# Patient Record
Sex: FEMALE | Race: BLACK OR AFRICAN AMERICAN | NOT HISPANIC OR LATINO | Employment: UNEMPLOYED | URBAN - NONMETROPOLITAN AREA
[De-identification: names, ages, dates, MRNs, and addresses within clinical notes are randomized per-mention and may not be internally consistent; named-entity substitution may affect disease eponyms.]

---
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
## 2022-04-10 ENCOUNTER — HISTORICAL (OUTPATIENT)
Dept: ADMINISTRATIVE | Facility: HOSPITAL | Age: 22
End: 2022-04-10

## 2022-04-26 VITALS
HEIGHT: 65 IN | WEIGHT: 154.31 LBS | BODY MASS INDEX: 25.71 KG/M2 | DIASTOLIC BLOOD PRESSURE: 60 MMHG | SYSTOLIC BLOOD PRESSURE: 96 MMHG | OXYGEN SATURATION: 98 %

## 2022-05-04 ENCOUNTER — HISTORICAL (OUTPATIENT)
Dept: ADMINISTRATIVE | Facility: HOSPITAL | Age: 22
End: 2022-05-04

## 2022-09-21 ENCOUNTER — HISTORICAL (OUTPATIENT)
Dept: ADMINISTRATIVE | Facility: HOSPITAL | Age: 22
End: 2022-09-21

## 2023-02-06 ENCOUNTER — HOSPITAL ENCOUNTER (EMERGENCY)
Facility: HOSPITAL | Age: 23
Discharge: HOME | End: 2023-02-06
Attending: FAMILY MEDICINE
Payer: MEDICAID

## 2023-02-06 VITALS
OXYGEN SATURATION: 100 % | RESPIRATION RATE: 17 BRPM | WEIGHT: 136 LBS | HEART RATE: 83 BPM | BODY MASS INDEX: 23.22 KG/M2 | TEMPERATURE: 99 F | SYSTOLIC BLOOD PRESSURE: 115 MMHG | HEIGHT: 64 IN | DIASTOLIC BLOOD PRESSURE: 65 MMHG

## 2023-02-06 DIAGNOSIS — O26.891 PELVIC PAIN AFFECTING PREGNANCY IN FIRST TRIMESTER, ANTEPARTUM: Primary | ICD-10-CM

## 2023-02-06 DIAGNOSIS — R10.2 PELVIC PAIN AFFECTING PREGNANCY IN FIRST TRIMESTER, ANTEPARTUM: Primary | ICD-10-CM

## 2023-02-06 LAB
APPEARANCE UR: CLEAR
BILIRUB UR QL STRIP.AUTO: NEGATIVE MG/DL
COLOR UR AUTO: YELLOW
GLUCOSE UR QL STRIP.AUTO: NEGATIVE MG/DL
KETONES UR QL STRIP.AUTO: NEGATIVE MG/DL
LEUKOCYTE ESTERASE UR QL STRIP.AUTO: NEGATIVE UNIT/L
NITRITE UR QL STRIP.AUTO: NEGATIVE
PH UR STRIP.AUTO: 6 [PH]
PROT UR QL STRIP.AUTO: NEGATIVE MG/DL
RBC UR QL AUTO: NEGATIVE UNIT/L
SP GR UR STRIP.AUTO: >=1.03
URINE CULTURE, ROUTINE: NEGATIVE
UROBILINOGEN UR STRIP-ACNC: 1 MG/DL

## 2023-02-06 PROCEDURE — 81003 URINALYSIS AUTO W/O SCOPE: CPT | Performed by: FAMILY MEDICINE

## 2023-02-06 PROCEDURE — 99284 EMERGENCY DEPT VISIT MOD MDM: CPT | Mod: 25

## 2023-02-06 NOTE — ED PROVIDER NOTES
Encounter Date: 2/6/2023       History     Chief Complaint   Patient presents with    Abdominal Pain     AMB TO ED WITH C/O ABDOMINAL PAIN X 3 DAYS. PT IS 11 WEEKS PREG.     Patient presents to the emergency room or some pelvic pain/pressure which has been happening for about 3 days or so.  She is 11 weeks pregnant confirmed by ultrasound denies any nausea or vomiting at the moment.  Denies any urinary symptoms.    The history is provided by the patient.   Review of patient's allergies indicates:  No Known Allergies  History reviewed. No pertinent past medical history.  History reviewed. No pertinent surgical history.  History reviewed. No pertinent family history.     Review of Systems   Constitutional:  Negative for fever.   HENT:  Negative for sore throat.    Respiratory:  Negative for shortness of breath.    Cardiovascular:  Negative for chest pain.   Gastrointestinal:  Negative for abdominal pain, nausea and vomiting.   Genitourinary:  Positive for pelvic pain. Negative for difficulty urinating, dysuria, flank pain and urgency.   Musculoskeletal:  Negative for back pain.   Skin:  Negative for rash.   Neurological:  Negative for weakness.   Hematological:  Does not bruise/bleed easily.   All other systems reviewed and are negative.    Physical Exam     Initial Vitals [02/06/23 0944]   BP Pulse Resp Temp SpO2   113/73 102 18 98.6 °F (37 °C) 100 %      MAP       --         Physical Exam    Nursing note and vitals reviewed.  Constitutional: She appears well-developed and well-nourished.   Cardiovascular:  Normal rate, regular rhythm, normal heart sounds and intact distal pulses.           Pulmonary/Chest: Breath sounds normal.   Abdominal: Abdomen is soft. Bowel sounds are normal. There is no abdominal tenderness. There is no rebound and no guarding.   Musculoskeletal:         General: Normal range of motion.     Neurological: She is alert and oriented to person, place, and time. GCS score is 15. GCS eye subscore  is 4. GCS verbal subscore is 5. GCS motor subscore is 6.   Skin: Skin is warm and dry.       ED Course   Procedures  Labs Reviewed   URINALYSIS    Narrative:      URINE STABILITY IS 2 HOURS AT ROOM TEMP OR    SIX HOURS REFRIGERATED. PERFORMING TESTING ON    SPECIMENS GREATER THAN THIS AGE MAY AFFECT THE    FOLLOWING TESTS:    PH          SPECIFIC GRAVITY           BLOOD    CLARITY     BILIRUBIN               UROBILINOGEN          Imaging Results              US OB <14 Wks, TransAbd, Single Gestation (Final result)  Result time 02/06/23 10:55:58      Final result by Sherie Gamino III, MD (02/06/23 10:55:58)                   Impression:      1. A single intrauterine pregnancy is present with a mean sonographic gestational age of 12 weeks 0 days.  No significant abnormalities are noted.      Electronically signed by: Sherie Gamino  Date:    02/06/2023  Time:    10:55               Narrative:    EXAMINATION:  STUDY: US OB <14 WEEKS TRANSABDOM, SINGLE GESTATION    CLINICAL HISTORY AND TECHNIQUE:  Sindy Felton RT on 2/6/2023 10:44 AM    ER    CLINICAL HX:APPROX 11 WEEKS WITH ABD PAIN FOR 3 DAYS    PMH; HX OF CONSTIPATION    TECH;2D TRANSABD 1ST TRIMESTER OB US WITH COLOR FLOW, DOPPLER AND M-MODE    US TECH:PLP    COMPARISON:  None    FINDINGS:  A gestational sac with a normal appearing decidual reaction is noted high within the endometrial cavity .  A fetal pole is noted within the gestational sac with a crown-rump length of 5.36 cm (12 weeks 0 days/66 percentile).  The fetus was reported to demonstrate normal spontaneous motility with a fetal heart rate of 166 beats per minute.  No definite fetal or maternal abnormalities are noted. The ovaries and adnexa, where visualized, appear unremarkable.                                       Medications - No data to display  Medical Decision Making:   Initial Assessment:   11 week IUP, pelvic pain  Differential Diagnosis:   Uti, miscarriage, cyst  Clinical Tests:   Lab Tests:  Ordered and Reviewed  The following lab test(s) were unremarkable: Urinalysis       <> Summary of Lab: clear  Radiological Study: Ordered and Reviewed                        Clinical Impression:   Final diagnoses:  [O26.891, R10.2] Pelvic pain affecting pregnancy in first trimester, antepartum (Primary)        ED Disposition Condition    Discharge Stable          ED Prescriptions    None       Follow-up Information       Follow up With Specialties Details Why Contact Info    Rosa Isela Ro MD Obstetrics and Gynecology  If symptoms worsen [address] [facility] [address]  [phone]               Gal Snider MD  02/06/23 0520

## 2023-02-08 ENCOUNTER — ROUTINE PRENATAL (OUTPATIENT)
Dept: OBSTETRICS AND GYNECOLOGY | Facility: CLINIC | Age: 23
End: 2023-02-08
Payer: MEDICAID

## 2023-02-08 ENCOUNTER — TELEPHONE (OUTPATIENT)
Dept: OBSTETRICS AND GYNECOLOGY | Facility: CLINIC | Age: 23
End: 2023-02-08

## 2023-02-08 VITALS
HEIGHT: 64 IN | BODY MASS INDEX: 23.49 KG/M2 | DIASTOLIC BLOOD PRESSURE: 68 MMHG | WEIGHT: 137.56 LBS | SYSTOLIC BLOOD PRESSURE: 108 MMHG

## 2023-02-08 DIAGNOSIS — O23.591 TRICHOMONAL VAGINITIS DURING PREGNANCY IN FIRST TRIMESTER: ICD-10-CM

## 2023-02-08 DIAGNOSIS — A59.01 TRICHOMONAL VAGINITIS DURING PREGNANCY IN FIRST TRIMESTER: ICD-10-CM

## 2023-02-08 DIAGNOSIS — Z3A.12 12 WEEKS GESTATION OF PREGNANCY: ICD-10-CM

## 2023-02-08 DIAGNOSIS — N85.A UTERINE SCAR FROM PREVIOUS CESAREAN DELIVERY: ICD-10-CM

## 2023-02-08 LAB
C TRACH DNA SPEC QL NAA+PROBE: NEGATIVE
N GONORRHOEA DNA SPEC QL NAA+PROBE: NEGATIVE

## 2023-02-08 PROCEDURE — 99213 OFFICE O/P EST LOW 20 MIN: CPT | Mod: TH,,, | Performed by: OBSTETRICS & GYNECOLOGY

## 2023-02-08 PROCEDURE — 99213 PR OFFICE/OUTPT VISIT, EST, LEVL III, 20-29 MIN: ICD-10-PCS | Mod: TH,,, | Performed by: OBSTETRICS & GYNECOLOGY

## 2023-02-08 RX ORDER — B-COMPLEX WITH VITAMIN C
1 TABLET ORAL
COMMUNITY
Start: 2022-12-14 | End: 2023-11-15

## 2023-02-08 NOTE — PROGRESS NOTES
"Chief Complaint: Routine OB    PRINCE +TV. 12w 0d. Tummy Check (Treated 2023)    HPI:   Freida Tim is a 22 y.o. year old   12w0d presents for her routine ob exam and PRINCE for +trich. She is doing well.     History reviewed. No pertinent past medical history.    Past Surgical History:   Procedure Laterality Date     SECTION  2021       Current Outpatient Medications:     PRENATAL VITAMIN 27 mg iron- 0.8 mg Tab, Take 1 tablet by mouth., Disp: , Rfl:     Review of patient's allergies indicates:  No Known Allergies    OB History    Para Term  AB Living   2 1 1     1   SAB IAB Ectopic Multiple Live Births           1      # Outcome Date GA Lbr Jeet/2nd Weight Sex Delivery Anes PTL Lv   2 Current            1 Term 21 38w0d  2.637 kg (5 lb 13 oz) F CS-LTranv   STEPHANIE       Social History     Tobacco Use    Smoking status: Never    Smokeless tobacco: Never   Substance Use Topics    Alcohol use: Not Currently    Drug use: Never       History reviewed. No pertinent family history.      Review Of Systems:   General/Constitutional: Fever denies .    Skin: Rash denies.   Respiratory: Cough denies . SOB denies . Wheezing denies .   Cardiovascular: Chest pain denies . Dizziness denies . Palpitations denies . Swelling in hands/feet denies    Gastrointestinal: Abdominal pain denies . Constipation denies . Diarrhea denies . Heartburn denies . Nausea denies . Vomiting denies    Genitourinary: Painful urination denies.   Gynecologic: Vaginal bleeding denies . Vaginal discharge Normal. Leaking amniotic fluid denies.  Contractions denies    Psychiatric: Depressed mood denies. Suicidal thoughts denies.       Physical Exam:   /68   Ht 5' 3.78" (1.62 m)   Wt 62.4 kg (137 lb 9.1 oz)   LMP 10/26/2022   BMI 23.78 kg/m²       Gen: NAD   Abd: Gravid, NT   Ext: No CCE   FHT: 150      Assessment:   There is no problem list on file for this patient.      1. Trichomonal vaginitis during " pregnancy in first trimester  22 +trich  23 sent out Flagyl 500mg BID x7 days  23 gc/cz/tv    2. Uterine scar from previous  delivery    3. 12 weeks gestation of pregnancy  Instructed on weight gain, healthy exercise, vitamins, avoidance of drugs tobacco and alcohol. Pain, fever, bleeding precautions.        Discussed with patient travel precautions.       Discussed with patient on the uncertainty of COVID 19 in relation to pregnancy complications with patient and unborn fetus.  Advised to CDC guidelines of social distancing, patient to stay home as much as possible, and limit contact with others.  Travel restrictions discussed.  Patient to call office if she has a temperature over 100.4 cough shortness of breath or GI symptoms. Patient educated if she feels she is in an emergency to call 911.        Educated patient it is strongly advised by American Congress of Obstetrics and Gyne[employer] as well as Society for Maternal Fetal Medicine Specialists to receive the COVID 19 vaccine. Educated patient that pregnant patients are at high risk of severe COVID 19 infections, including but not limited to death.        Plan:   Follow up 4 weeks

## 2023-02-08 NOTE — TELEPHONE ENCOUNTER
----- Message from Mitzy Manzano sent at 2/8/2023  3:56 PM CST -----  Regarding: Med refill  Type:  RX Refill Request    Refill or New Rx: refill  Preferred Pharmacy with phone number:[facility] in [place]  Local or Mail Order: local  Ordering Provider: Jia  Would the patient rather a call back or a response via [facility]? Call back  Best Call Back Number: [phone]  Additional Information: Patient had appt today, forgot to mention she needed more prenatals sent to [facility] here in [place].

## 2023-02-08 NOTE — TELEPHONE ENCOUNTER
----- Message from Mitzy Manzano sent at 2/8/2023  3:56 PM CST -----  Regarding: Med refill  Type:  RX Refill Request    Refill or New Rx: refill  Preferred Pharmacy with phone number:[facility] in [place]  Local or Mail Order: local  Ordering Provider: Jia  Would the patient rather a call back or a response via [facility]? Call back  Best Call Back Number: [phone]  Additional Information: Patient had appt today, forgot to mention she needed more prenatals sent to [facility] here in [place].

## 2023-02-08 NOTE — TELEPHONE ENCOUNTER
----- Message from Mitzy Manzano sent at 2/8/2023  3:56 PM CST -----  Regarding: Med refill  Type:  RX Refill Request    Refill or New Rx: refill  Preferred Pharmacy with phone number:[facility] in [place]  Local or Mail Order: local  Ordering Provider: Jia  Would the patient rather a call back or a response via [facility]? Call back  Best Call Back Number: [phone]  Additional Information: Patient had appt today, forgot to mention she needed more prenatals sent to [facility] here in [place].

## 2023-02-15 ENCOUNTER — LAB VISIT (OUTPATIENT)
Dept: LAB | Facility: HOSPITAL | Age: 23
End: 2023-02-15
Attending: OBSTETRICS & GYNECOLOGY
Payer: MEDICAID

## 2023-02-15 DIAGNOSIS — Z34.90 PREGNANCY, UNSPECIFIED GESTATIONAL AGE: Primary | ICD-10-CM

## 2023-02-15 LAB
ABO + RH BLD: NORMAL
ABO AND RH: NORMAL
ANTIBODY SCREEN: NORMAL
ERYTHROCYTE [DISTWIDTH] IN BLOOD BY AUTOMATED COUNT: 12.1 % (ref 11–14.5)
EXT MATERNIT21: NEGATIVE
HBV SURFACE AG SERPL QL IA: NEGATIVE
HBV SURFACE AG SERPL QL IA: NORMAL
HCT VFR BLD AUTO: 34.6 % (ref 36–48)
HEMOGLOBIN BANDS: NEGATIVE
HGB BLD-MCNC: 12.1 GM/DL (ref 11.8–16)
INDIRECT COOMBS: NEGATIVE
MCH RBC QN AUTO: 31.9 PG (ref 27–34)
MCV RBC AUTO: 91.3 FL (ref 79–99)
MEAN CELL HEMOGLOBIN CONCENTRATION (OHS) G/DL: 35 G/DL (ref 31–37)
NRBC BLD AUTO-RTO: 0 % (ref 0–1)
PLATELET # BLD AUTO: 218 X10(3)/MCL (ref 140–371)
PMV BLD AUTO: 10.1 FL (ref 9.4–12.4)
RBC # BLD AUTO: 3.79 X10(6)/MCL (ref 4–5.1)
RUBELLA AB, IGG (OHS): 3.7 IU/ML
VARICELLA ANTIBODY, IGG, CSF: NORMAL
WBC # SPEC AUTO: 5 X10(3)/MCL (ref 4–11.5)

## 2023-02-15 PROCEDURE — 36415 COLL VENOUS BLD VENIPUNCTURE: CPT

## 2023-02-15 PROCEDURE — 86787 VARICELLA-ZOSTER ANTIBODY: CPT

## 2023-02-15 PROCEDURE — 87340 HEPATITIS B SURFACE AG IA: CPT

## 2023-02-15 PROCEDURE — 86592 SYPHILIS TEST NON-TREP QUAL: CPT

## 2023-02-15 PROCEDURE — 86803 HEPATITIS C AB TEST: CPT

## 2023-02-15 PROCEDURE — 83020 HEMOGLOBIN ELECTROPHORESIS: CPT

## 2023-02-15 PROCEDURE — 87389 HIV-1 AG W/HIV-1&-2 AB AG IA: CPT

## 2023-02-15 PROCEDURE — 85027 COMPLETE CBC AUTOMATED: CPT

## 2023-02-15 PROCEDURE — 86762 RUBELLA ANTIBODY: CPT

## 2023-02-15 PROCEDURE — 86900 BLOOD TYPING SEROLOGIC ABO: CPT | Performed by: OBSTETRICS & GYNECOLOGY

## 2023-02-16 LAB
HCV AB SERPL QL IA: NONREACTIVE
HIV 1+2 AB+HIV1 P24 AG SERPL QL IA: NONREACTIVE

## 2023-02-17 LAB
VZV IGG SER IA-ACNC: 1
VZV IGG SER QL IA: NORMAL

## 2023-02-18 LAB
HGB A MFR BLD ELPH: 97.4 % (ref 95.8–98)
HGB A2 MFR BLD ELPH: 2.6 % (ref 2–3.3)
HGB F MFR BLD ELPH: 0 % (ref 0–0.9)
HGB FRACT BLD ELPH-IMP: NORMAL
M HPLC HB VARIANT, B: NORMAL

## 2023-02-23 LAB
RPR SER QL: NORMAL
RPR SER-TITR: NORMAL {TITER}

## 2023-03-07 NOTE — PROGRESS NOTES
"Chief Complaint: Routine OB    Routine Prenatal Visit    HPI:   Freida Tim is a 22 y.o. year old  at 16w0d presents for her routine ob exam. She is doing well.     No past medical history on file.    Past Surgical History:   Procedure Laterality Date     SECTION  2021       Current Outpatient Medications:     PRENATAL VITAMIN 27 mg iron- 0.8 mg Tab, Take 1 tablet by mouth., Disp: , Rfl:     Review of patient's allergies indicates:  No Known Allergies    OB History    Para Term  AB Living   2 1 1     1   SAB IAB Ectopic Multiple Live Births           1      # Outcome Date GA Lbr Jeet/2nd Weight Sex Delivery Anes PTL Lv   2 Current            1 Term 21 38w0d  2.637 kg (5 lb 13 oz) F CS-LTranv   STEPHANIE       Social History     Tobacco Use    Smoking status: Never    Smokeless tobacco: Never   Substance Use Topics    Alcohol use: Not Currently    Drug use: Never       No family history on file.      Review Of Systems:   General/Constitutional: Fever denies .    Skin: Rash denies.   Respiratory: Cough denies . SOB denies . Wheezing denies .   Cardiovascular: Chest pain denies . Dizziness denies . Palpitations denies . Swelling in hands/feet denies    Gastrointestinal: Abdominal pain denies . Constipation denies . Diarrhea denies . Heartburn denies . Nausea denies . Vomiting denies    Genitourinary: Painful urination denies.   Gynecologic: Vaginal bleeding denies . Vaginal discharge Normal. Leaking amniotic fluid denies.  Contractions denies    Psychiatric: Depressed mood denies. Suicidal thoughts denies.       Physical Exam:   /68   Temp 97 °F (36.1 °C)   Ht 5' 3.78" (1.62 m)   Wt 63.9 kg (140 lb 12.8 oz)   LMP 10/26/2022   BMI 24.34 kg/m²       Gen: NAD   Abd: Gravid, NT   Ext: No CCE   FHT: 150      Assessment:   There is no problem list on file for this patient.        1. Trichomonal vaginitis during pregnancy in first trimester  22 +trich; treated w/ flagyl " 500mg BID x7 days  23 neg PRINCE    2. Uterine scar from previous  delivery    3. 16 weeks gestation of pregnancy  Instructed on weight gain, healthy exercise, vitamins, avoidance of drugs tobacco and alcohol. Pain, fever, bleeding precautions.        Discussed with patient travel precautions.       Discussed with patient on the uncertainty of COVID 19 in relation to pregnancy complications with patient and unborn fetus.  Advised to CDC guidelines of social distancing, patient to stay home as much as possible, and limit contact with others.  Travel restrictions discussed.  Patient to call office if she has a temperature over 100.4 cough shortness of breath or GI symptoms. Patient educated if she feels she is in an emergency to call 911.        Educated patient it is strongly advised by American Congress of Obstetrics and Gynecology as well as Society for Maternal Fetal Medicine Specialists to receive the COVID 19 vaccine. Educated patient that pregnant patients are at high risk of severe COVID 19 infections, including but not limited to death.     -     US OB 14+ Wks, TransAbd, Single Gestation; Future; Expected date: 2023       Follow up 4 weeks

## 2023-03-08 ENCOUNTER — ROUTINE PRENATAL (OUTPATIENT)
Dept: OBSTETRICS AND GYNECOLOGY | Facility: CLINIC | Age: 23
End: 2023-03-08
Payer: MEDICAID

## 2023-03-08 ENCOUNTER — PATIENT MESSAGE (OUTPATIENT)
Dept: OTHER | Facility: OTHER | Age: 23
End: 2023-03-08
Payer: MEDICAID

## 2023-03-08 VITALS
HEIGHT: 64 IN | TEMPERATURE: 97 F | WEIGHT: 140.81 LBS | SYSTOLIC BLOOD PRESSURE: 112 MMHG | DIASTOLIC BLOOD PRESSURE: 68 MMHG | BODY MASS INDEX: 24.04 KG/M2

## 2023-03-08 DIAGNOSIS — N85.A UTERINE SCAR FROM PREVIOUS CESAREAN DELIVERY: ICD-10-CM

## 2023-03-08 DIAGNOSIS — O23.591 TRICHOMONAL VAGINITIS DURING PREGNANCY IN FIRST TRIMESTER: ICD-10-CM

## 2023-03-08 DIAGNOSIS — A59.01 TRICHOMONAL VAGINITIS DURING PREGNANCY IN FIRST TRIMESTER: ICD-10-CM

## 2023-03-08 DIAGNOSIS — Z3A.16 16 WEEKS GESTATION OF PREGNANCY: ICD-10-CM

## 2023-03-08 PROCEDURE — 99213 OFFICE O/P EST LOW 20 MIN: CPT | Mod: TH,,, | Performed by: OBSTETRICS & GYNECOLOGY

## 2023-03-08 PROCEDURE — 99213 PR OFFICE/OUTPT VISIT, EST, LEVL III, 20-29 MIN: ICD-10-PCS | Mod: TH,,, | Performed by: OBSTETRICS & GYNECOLOGY

## 2023-03-15 ENCOUNTER — PATIENT MESSAGE (OUTPATIENT)
Dept: OTHER | Facility: OTHER | Age: 23
End: 2023-03-15
Payer: MEDICAID

## 2023-03-30 ENCOUNTER — HOSPITAL ENCOUNTER (OUTPATIENT)
Dept: RADIOLOGY | Facility: HOSPITAL | Age: 23
Discharge: HOME | End: 2023-03-30
Attending: OBSTETRICS & GYNECOLOGY
Payer: MEDICAID

## 2023-03-30 DIAGNOSIS — Z3A.16 16 WEEKS GESTATION OF PREGNANCY: ICD-10-CM

## 2023-03-30 PROCEDURE — 76805 OB US >/= 14 WKS SNGL FETUS: CPT | Mod: TC

## 2023-03-30 NOTE — PROGRESS NOTES
"Chief Complaint:  Routine Prenatal Visit    History of Present Illness:  Freida Tim is a 22 y.o. year old  at 19w6d presents for her routine ob exam. She is doing well.       Review of Systems:  General/Constitutional: Fever denies .    Skin: Rash denies.   Respiratory: Cough denies . SOB denies . Wheezing denies .   Cardiovascular: Chest pain denies . Dizziness denies . Palpitations denies . Swelling in hands/feet denies    Gastrointestinal: Abdominal pain denies . Constipation denies . Diarrhea denies . Heartburn denies . Nausea denies . Vomiting denies    Genitourinary: Painful urination denies.   Gynecologic: Vaginal bleeding denies . Vaginal discharge Normal. Leaking amniotic fluid denies.  Contractions denies    Psychiatric: Depressed mood denies. Suicidal thoughts denies.     OB History          2    Para   1    Term   1            AB        Living   1         SAB        IAB        Ectopic        Multiple        Live Births   1               History reviewed. No pertinent past medical history.      Current Outpatient Medications:     PRENATAL VITAMIN 27 mg iron- 0.8 mg Tab, Take 1 tablet by mouth., Disp: , Rfl:     Review of patient's allergies indicates:  No Known Allergies    Past Surgical History:   Procedure Laterality Date     SECTION  2021     History reviewed. No pertinent family history.    Social History     Socioeconomic History    Marital status: Single   Tobacco Use    Smoking status: Never    Smokeless tobacco: Never   Substance and Sexual Activity    Alcohol use: Not Currently    Drug use: Never    Sexual activity: Yes     Partners: Male     Comment: pregnant       Physical Exam:  /64   Temp 97.3 °F (36.3 °C)   Ht 5' 3.78" (1.62 m)   Wt 66.1 kg (145 lb 11.6 oz)   LMP 10/26/2022   BMI 25.19 kg/m²     Gen: NAD   Abd: Gravid, NT   Ext: No CCE   FHT: 140      Assessment/Plan  1. Uterine scar from previous  delivery    2. 19 weeks gestation " of pregnancy  Instructed on weight gain, healthy exercise, vitamins, avoidance of drugs tobacco and alcohol. Pain, fever, bleeding precautions.        Discussed with patient travel precautions.       Discussed with patient on the uncertainty of COVID 19 in relation to pregnancy complications with patient and unborn fetus.  Advised to CDC guidelines of social distancing, patient to stay home as much as possible, and limit contact with others.  Travel restrictions discussed.  Patient to call office if she has a temperature over 100.4 cough shortness of breath or GI symptoms. Patient educated if she feels she is in an emergency to call 911.        Educated patient it is strongly advised by American Congress of Obstetrics and Gynecology as well as Society for Maternal Fetal Medicine Specialists to receive the COVID 19 vaccine. Educated patient that pregnant patients are at high risk of severe COVID 19 infections, including but not limited to death.     Follow up 4 weeks

## 2023-04-04 ENCOUNTER — ROUTINE PRENATAL (OUTPATIENT)
Dept: OBSTETRICS AND GYNECOLOGY | Facility: CLINIC | Age: 23
End: 2023-04-04
Payer: MEDICAID

## 2023-04-04 VITALS
WEIGHT: 145.75 LBS | HEIGHT: 64 IN | BODY MASS INDEX: 24.88 KG/M2 | SYSTOLIC BLOOD PRESSURE: 112 MMHG | DIASTOLIC BLOOD PRESSURE: 64 MMHG | TEMPERATURE: 97 F

## 2023-04-04 DIAGNOSIS — Z3A.19 19 WEEKS GESTATION OF PREGNANCY: ICD-10-CM

## 2023-04-04 DIAGNOSIS — N85.A UTERINE SCAR FROM PREVIOUS CESAREAN DELIVERY: Primary | ICD-10-CM

## 2023-04-04 PROCEDURE — 99213 OFFICE O/P EST LOW 20 MIN: CPT | Mod: TH,,, | Performed by: OBSTETRICS & GYNECOLOGY

## 2023-04-04 PROCEDURE — 99213 PR OFFICE/OUTPT VISIT, EST, LEVL III, 20-29 MIN: ICD-10-PCS | Mod: TH,,, | Performed by: OBSTETRICS & GYNECOLOGY

## 2023-04-05 ENCOUNTER — PATIENT MESSAGE (OUTPATIENT)
Dept: OTHER | Facility: OTHER | Age: 23
End: 2023-04-05
Payer: MEDICAID

## 2023-05-03 ENCOUNTER — PATIENT MESSAGE (OUTPATIENT)
Dept: OTHER | Facility: OTHER | Age: 23
End: 2023-05-03
Payer: MEDICAID

## 2023-05-04 NOTE — PROGRESS NOTES
"Chief Complaint:  Routine Prenatal Visit (24w6d tummy)    History of Present Illness:  Freida Tim is a 22 y.o. year old  at 24w6d presents for her routine ob exam. She is doing well. No complaints today. +fetal movement. Denies VB/VD/LOF/CTX. Denies PReE ROS.       Review of Systems:  General/Constitutional: Fever denies .    Skin: Rash denies.   Respiratory: Cough denies. SOB denies. Wheezing denies .   Cardiovascular: Chest pain denies. Dizziness denies. Palpitations denies. Swelling in hands/feet denies    Gastrointestinal: Abdominal pain denies. Constipation denies. Diarrhea denies. Heartburn denies. Nausea denies. Vomiting denies    Genitourinary: Painful urination denies.   Gynecologic: Vaginal bleeding denies. Vaginal discharge Normal. Leaking amniotic fluid denies.  Contractions denies    Psychiatric: Depressed mood denies. Suicidal thoughts denies.       Current Outpatient Medications:     PRENATAL VITAMIN 27 mg iron- 0.8 mg Tab, Take 1 tablet by mouth., Disp: , Rfl:       Physical Exam:  /64   Temp 97.9 °F (36.6 °C)   Ht 5' 3" (1.6 m)   Wt 70 kg (154 lb 5.2 oz)   LMP 10/26/2022   BMI 27.34 kg/m²     Gen: NAD   Abd: Gravid, NT   Ext: No CCE   FH: 25cm  FHT: 150      Assessment/Plan  1. Uterine scar from previous  delivery    2. 24 weeks gestation of pregnancy  Discussed that should any of the following symptoms occur during pregnancy, patient should contact the office immediately or go to the ER after business hours: spotting or bleeding, cramping, painful urination, severe vomiting, pain in one leg or calf, sudden gush of fluid, decrease or absence of fetal movement, sudden weight gain, periorbital or facial swelling, headache with visual changes and/or photophobia.       Discussed with patient travel precautions.       Discussed with patient on the uncertainty of COVID 19 in relation to pregnancy complications with patient and unborn fetus. Advised to CDC guidelines of " social distancing, patient to stay home as much as possible, and limit contact with others. Travel restrictions discussed. Patient to call office if she has a temperature over 100.4 cough shortness of breath or GI symptoms. Patient educated if she feels she is in an emergency to call 911.        Educated patient it is strongly advised by American Congress of Obstetrics and Gynecology as well as Society for Maternal Fetal Medicine Specialists to receive the COVID 19 vaccine. Educated patient that pregnant patients are at high risk of severe COVID 19 infections, including but not limited to death.     Follow up 4 weeks    [place]

## 2023-05-09 ENCOUNTER — ROUTINE PRENATAL (OUTPATIENT)
Dept: OBSTETRICS AND GYNECOLOGY | Facility: CLINIC | Age: 23
End: 2023-05-09
Payer: MEDICAID

## 2023-05-09 VITALS
DIASTOLIC BLOOD PRESSURE: 64 MMHG | HEIGHT: 63 IN | BODY MASS INDEX: 27.34 KG/M2 | TEMPERATURE: 98 F | WEIGHT: 154.31 LBS | SYSTOLIC BLOOD PRESSURE: 110 MMHG

## 2023-05-09 DIAGNOSIS — Z3A.24 24 WEEKS GESTATION OF PREGNANCY: ICD-10-CM

## 2023-05-09 DIAGNOSIS — N85.A UTERINE SCAR FROM PREVIOUS CESAREAN DELIVERY: Primary | ICD-10-CM

## 2023-05-09 PROCEDURE — 99213 OFFICE O/P EST LOW 20 MIN: CPT | Mod: TH,,, | Performed by: OBSTETRICS & GYNECOLOGY

## 2023-05-09 PROCEDURE — 99213 PR OFFICE/OUTPT VISIT, EST, LEVL III, 20-29 MIN: ICD-10-PCS | Mod: TH,,, | Performed by: OBSTETRICS & GYNECOLOGY

## 2023-05-17 ENCOUNTER — PATIENT MESSAGE (OUTPATIENT)
Dept: OTHER | Facility: OTHER | Age: 23
End: 2023-05-17
Payer: MEDICAID

## 2023-05-29 ENCOUNTER — ROUTINE PRENATAL (OUTPATIENT)
Dept: OBSTETRICS AND GYNECOLOGY | Facility: CLINIC | Age: 23
End: 2023-05-29
Payer: MEDICAID

## 2023-05-29 VITALS
HEIGHT: 63 IN | SYSTOLIC BLOOD PRESSURE: 114 MMHG | DIASTOLIC BLOOD PRESSURE: 68 MMHG | BODY MASS INDEX: 28.11 KG/M2 | WEIGHT: 158.63 LBS

## 2023-05-29 DIAGNOSIS — O09.899 RUBELLA NON-IMMUNE STATUS, ANTEPARTUM: ICD-10-CM

## 2023-05-29 DIAGNOSIS — Z28.39 RUBELLA NON-IMMUNE STATUS, ANTEPARTUM: ICD-10-CM

## 2023-05-29 DIAGNOSIS — N85.A UTERINE SCAR FROM PREVIOUS CESAREAN DELIVERY: Primary | ICD-10-CM

## 2023-05-29 DIAGNOSIS — Z3A.27 27 WEEKS GESTATION OF PREGNANCY: ICD-10-CM

## 2023-05-29 PROCEDURE — 99213 OFFICE O/P EST LOW 20 MIN: CPT | Mod: TH,,, | Performed by: OBSTETRICS & GYNECOLOGY

## 2023-05-29 PROCEDURE — 99213 PR OFFICE/OUTPT VISIT, EST, LEVL III, 20-29 MIN: ICD-10-PCS | Mod: TH,,, | Performed by: OBSTETRICS & GYNECOLOGY

## 2023-05-29 NOTE — PROGRESS NOTES
"Chief Complaint:  tummy check.     History of Present Illness:  Freida Tim is a 22 y.o. year old  at 27w5d presents for her routine ob exam. She is doing well. +fetal movement. Denies vaginal bleeding, vaginal discharge, LOF or contractions. Negative preeclampsia ROS.      Review of Systems:  General/Constitutional: Fever denies .    Skin: Rash denies.   Respiratory: Cough denies. SOB denies. Wheezing denies .   Cardiovascular: Chest pain denies. Dizziness denies. Palpitations denies. Swelling in hands/feet denies    Gastrointestinal: Abdominal pain denies. Constipation denies. Diarrhea denies. Heartburn denies. Nausea denies. Vomiting denies    Genitourinary: Painful urination denies.   Gynecologic: Vaginal bleeding denies. Vaginal discharge Normal. Leaking amniotic fluid denies. Contractions denies    Psychiatric: Depressed mood denies. Suicidal thoughts denies.     OB History    Para Term  AB Living   2 1 1 0 0 1   SAB IAB Ectopic Multiple Live Births   0 0 0 0 1      # 1 - Date: 21, Sex: Female, Weight: 2.637 kg (5 lb 13 oz), GA: 38w0d, Delivery: , Low Transverse, Apgar1: None, Apgar5: None, Living: Living, Birth Comments: None    # 2 - Date: None, Sex: None, Weight: None, GA: None, Delivery: None, Apgar1: None, Apgar5: None, Living: None, Birth Comments: None        Current Outpatient Medications:     PRENATAL VITAMIN 27 mg iron- 0.8 mg Tab, Take 1 tablet by mouth., Disp: , Rfl:       Physical Exam:  /68   Ht 5' 2.99" (1.6 m)   Wt 71.9 kg (158 lb 9.6 oz)   LMP 10/26/2022   BMI 28.10 kg/m²     Gen: NAD   Abd: Gravid, NT   Ext: No CCE   FH: 27cm  FHT: 130      Assessment/Plan  1. Uterine scar from previous  delivery  2. 27 weeks gestation of pregnancy  Discussed that should any of the following symptoms occur during pregnancy, patient should contact the office immediately or go to the ER after business hours: spotting or bleeding, cramping, painful " urination, severe vomiting, pain in one leg or calf, sudden gush of fluid, decrease or absence of fetal movement, sudden weight gain, periorbital or facial swelling, headache with visual changes and/or photophobia.       Discussed with patient travel precautions.       Discussed with patient on the uncertainty of COVID 19 in relation to pregnancy complications with patient and unborn fetus. Advised to CDC guidelines of social distancing, patient to stay home as much as possible, and limit contact with others. Travel restrictions discussed. Patient to call office if she has a temperature over 100.4 cough shortness of breath or GI symptoms. Patient educated if she feels she is in an emergency to call 911.        Educated patient it is strongly advised by American Congress of Obstetrics and Gynecology as well as Society for Maternal Fetal Medicine Specialists to receive the COVID 19 vaccine. Educated patient that pregnant patients are at high risk of severe COVID 19 infections, including but not limited to death.     Follow up 3 weeks

## 2023-05-31 ENCOUNTER — PATIENT MESSAGE (OUTPATIENT)
Dept: OTHER | Facility: OTHER | Age: 23
End: 2023-05-31
Payer: MEDICAID

## 2023-06-13 ENCOUNTER — LAB VISIT (OUTPATIENT)
Dept: LAB | Facility: HOSPITAL | Age: 23
End: 2023-06-13
Attending: OBSTETRICS & GYNECOLOGY
Payer: MEDICAID

## 2023-06-13 DIAGNOSIS — Z3A.27 27 WEEKS GESTATION OF PREGNANCY: ICD-10-CM

## 2023-06-13 LAB — GLUCOSE 1H P 100 G GLC PO SERPL-MCNC: 60 MG/DL

## 2023-06-13 PROCEDURE — 36415 COLL VENOUS BLD VENIPUNCTURE: CPT

## 2023-06-13 PROCEDURE — 82950 GLUCOSE TEST: CPT

## 2023-06-14 ENCOUNTER — PATIENT MESSAGE (OUTPATIENT)
Dept: OTHER | Facility: OTHER | Age: 23
End: 2023-06-14
Payer: MEDICAID

## 2023-06-16 NOTE — PROGRESS NOTES
Chief Complaint:  Routine Prenatal Visit (30.6 wks tummy check)    History of Present Illness:  Freida Tim is a 22 y.o. year old  at 30w6d presents for her routine ob exam. She is doing well. +fetal movement. Denies vaginal bleeding, vaginal discharge, LOF or contractions. Negative preeclampsia ROS.      Review of Systems:  General/Constitutional: Fever denies .    Skin: Rash denies.   Respiratory: Cough denies. SOB denies. Wheezing denies .   Cardiovascular: Chest pain denies. Dizziness denies. Palpitations denies. Swelling in hands/feet denies    Gastrointestinal: Abdominal pain denies. Constipation denies. Diarrhea denies. Heartburn denies. Nausea denies. Vomiting denies    Genitourinary: Painful urination denies.   Gynecologic: Vaginal bleeding denies. Vaginal discharge Normal. Leaking amniotic fluid denies. Contractions denies    Psychiatric: Depressed mood denies. Suicidal thoughts denies.     OB History    Para Term  AB Living   2 1 1 0 0 1   SAB IAB Ectopic Multiple Live Births   0 0 0 0 1      # 1 - Date: 21, Sex: Female, Weight: 2.637 kg (5 lb 13 oz), GA: 38w0d, Delivery: , Low Transverse, Apgar1: None, Apgar5: None, Living: Living, Birth Comments: None    # 2 - Date: None, Sex: None, Weight: None, GA: None, Delivery: None, Apgar1: None, Apgar5: None, Living: None, Birth Comments: None        Current Outpatient Medications:     PRENATAL VITAMIN 27 mg iron- 0.8 mg Tab, Take 1 tablet by mouth., Disp: , Rfl:       Physical Exam:  /62 (BP Location: Right arm, Patient Position: Sitting, BP Method: Medium (Manual))   Temp 97.9 °F (36.6 °C) (Temporal)   Wt 75.5 kg (166 lb 7.2 oz)   LMP 10/26/2022   BMI 29.49 kg/m²     Gen: NAD   Abd: Gravid, NT   Ext: No CCE   FH: 30cm  FHT: 160      Assessment/Plan  1. Rubella non-immune status, antepartum  2. Uterine scar from previous  delivery  3. 30 weeks gestation of pregnancy  Discussed that should any of the  following symptoms occur during pregnancy, patient should contact the office immediately or go to the ER after business hours: spotting or bleeding, cramping, painful urination, severe vomiting, pain in one leg or calf, sudden gush of fluid, decrease or absence of fetal movement, sudden weight gain, periorbital or facial swelling, headache with visual changes and/or photophobia.       Discussed with patient travel precautions.       Discussed with patient on the uncertainty of COVID 19 in relation to pregnancy complications with patient and unborn fetus. Advised to CDC guidelines of social distancing, patient to stay home as much as possible, and limit contact with others. Travel restrictions discussed. Patient to call office if she has a temperature over 100.4 cough shortness of breath or GI symptoms. Patient educated if she feels she is in an emergency to call 911.        Educated patient it is strongly advised by [employer] as well as Society for Maternal Fetal [employer] Specialists to receive the COVID 19 vaccine. Educated patient that pregnant patients are at high risk of severe COVID 19 infections, including but not limited to death.     CBC, RPR   Follow up 2 weeks

## 2023-06-20 ENCOUNTER — ROUTINE PRENATAL (OUTPATIENT)
Dept: OBSTETRICS AND GYNECOLOGY | Facility: CLINIC | Age: 23
End: 2023-06-20
Payer: MEDICAID

## 2023-06-20 VITALS
SYSTOLIC BLOOD PRESSURE: 106 MMHG | DIASTOLIC BLOOD PRESSURE: 62 MMHG | WEIGHT: 166.44 LBS | BODY MASS INDEX: 29.49 KG/M2 | TEMPERATURE: 98 F

## 2023-06-20 DIAGNOSIS — Z28.39 RUBELLA NON-IMMUNE STATUS, ANTEPARTUM: ICD-10-CM

## 2023-06-20 DIAGNOSIS — N85.A UTERINE SCAR FROM PREVIOUS CESAREAN DELIVERY: ICD-10-CM

## 2023-06-20 DIAGNOSIS — Z3A.30 30 WEEKS GESTATION OF PREGNANCY: ICD-10-CM

## 2023-06-20 DIAGNOSIS — O09.899 RUBELLA NON-IMMUNE STATUS, ANTEPARTUM: ICD-10-CM

## 2023-06-20 PROCEDURE — 99213 OFFICE O/P EST LOW 20 MIN: CPT | Mod: TH,,, | Performed by: OBSTETRICS & GYNECOLOGY

## 2023-06-20 PROCEDURE — 99213 PR OFFICE/OUTPT VISIT, EST, LEVL III, 20-29 MIN: ICD-10-PCS | Mod: TH,,, | Performed by: OBSTETRICS & GYNECOLOGY

## 2023-06-28 ENCOUNTER — PATIENT MESSAGE (OUTPATIENT)
Dept: OTHER | Facility: OTHER | Age: 23
End: 2023-06-28
Payer: MEDICAID

## 2023-06-30 ENCOUNTER — TELEPHONE (OUTPATIENT)
Dept: OBSTETRICS AND GYNECOLOGY | Facility: CLINIC | Age: 23
End: 2023-06-30
Payer: MEDICAID

## 2023-07-03 ENCOUNTER — ROUTINE PRENATAL (OUTPATIENT)
Dept: OBSTETRICS AND GYNECOLOGY | Facility: CLINIC | Age: 23
End: 2023-07-03
Payer: MEDICAID

## 2023-07-03 VITALS
SYSTOLIC BLOOD PRESSURE: 108 MMHG | WEIGHT: 170.19 LBS | BODY MASS INDEX: 30.16 KG/M2 | HEIGHT: 63 IN | TEMPERATURE: 97 F | DIASTOLIC BLOOD PRESSURE: 64 MMHG

## 2023-07-03 DIAGNOSIS — O09.899 RUBELLA NON-IMMUNE STATUS, ANTEPARTUM: Primary | ICD-10-CM

## 2023-07-03 DIAGNOSIS — Z3A.32 32 WEEKS GESTATION OF PREGNANCY: ICD-10-CM

## 2023-07-03 DIAGNOSIS — N85.A UTERINE SCAR FROM PREVIOUS CESAREAN DELIVERY: ICD-10-CM

## 2023-07-03 DIAGNOSIS — Z23 NEED FOR TDAP VACCINATION: ICD-10-CM

## 2023-07-03 DIAGNOSIS — Z28.39 RUBELLA NON-IMMUNE STATUS, ANTEPARTUM: Primary | ICD-10-CM

## 2023-07-03 LAB
BILIRUB UR QL STRIP: NORMAL
GLUCOSE UR QL STRIP: NEGATIVE
KETONES UR QL STRIP: NORMAL
LEUKOCYTE ESTERASE UR QL STRIP: NEGATIVE
PH, POC UA: NORMAL
POC BLOOD, URINE: NORMAL
POC NITRATES, URINE: NEGATIVE
PROT UR QL STRIP: NEGATIVE
SP GR UR STRIP: NORMAL (ref 1–1.03)
UROBILINOGEN UR STRIP-ACNC: NORMAL (ref 0.3–2.2)

## 2023-07-03 PROCEDURE — 99213 OFFICE O/P EST LOW 20 MIN: CPT | Mod: TH,25,, | Performed by: OBSTETRICS & GYNECOLOGY

## 2023-07-03 PROCEDURE — 90715 TDAP VACCINE 7 YRS/> IM: CPT | Mod: ,,, | Performed by: OBSTETRICS & GYNECOLOGY

## 2023-07-03 PROCEDURE — 99213 PR OFFICE/OUTPT VISIT, EST, LEVL III, 20-29 MIN: ICD-10-PCS | Mod: TH,25,, | Performed by: OBSTETRICS & GYNECOLOGY

## 2023-07-03 PROCEDURE — 90715 TDAP VACCINE GREATER THAN OR EQUAL TO 7YO IM: ICD-10-PCS | Mod: ,,, | Performed by: OBSTETRICS & GYNECOLOGY

## 2023-07-03 PROCEDURE — 90471 TDAP VACCINE GREATER THAN OR EQUAL TO 7YO IM: ICD-10-PCS | Mod: ,,, | Performed by: OBSTETRICS & GYNECOLOGY

## 2023-07-03 PROCEDURE — 90471 IMMUNIZATION ADMIN: CPT | Mod: ,,, | Performed by: OBSTETRICS & GYNECOLOGY

## 2023-07-03 NOTE — PROGRESS NOTES
"Chief Complaint:  Routine Prenatal Visit    History of Present Illness:  Freida Tim is a 22 y.o. year old  at 32w5d presents for her routine ob exam. She is doing well. +fetal movement. Denies vaginal bleeding, vaginal discharge, LOF or contractions. Negative preeclampsia ROS.      Review of Systems:  General/Constitutional: Fever denies .    Skin: Rash denies.   Respiratory: Cough denies. SOB denies. Wheezing denies .   Cardiovascular: Chest pain denies. Dizziness denies. Palpitations denies. Swelling in hands/feet denies    Gastrointestinal: Abdominal pain denies. Constipation denies. Diarrhea denies. Heartburn denies. Nausea denies. Vomiting denies    Genitourinary: Painful urination denies.   Gynecologic: Vaginal bleeding denies. Vaginal discharge Normal. Leaking amniotic fluid denies. Contractions denies    Psychiatric: Depressed mood denies. Suicidal thoughts denies.     OB History    Para Term  AB Living   2 1 1 0 0 1   SAB IAB Ectopic Multiple Live Births   0 0 0 0 1      # 1 - Date: 21, Sex: Female, Weight: 2.637 kg (5 lb 13 oz), GA: 38w0d, Delivery: , Low Transverse, Apgar1: None, Apgar5: None, Living: Living, Birth Comments: None    # 2 - Date: None, Sex: None, Weight: None, GA: None, Delivery: None, Apgar1: None, Apgar5: None, Living: None, Birth Comments: None        Current Outpatient Medications:     PRENATAL VITAMIN 27 mg iron- 0.8 mg Tab, Take 1 tablet by mouth., Disp: , Rfl:       Physical Exam:  /64 (BP Location: Right arm)   Temp 97.2 °F (36.2 °C)   Ht 5' 2.99" (1.6 m)   Wt 77.2 kg (170 lb 3.1 oz)   LMP 10/26/2022   BMI 30.16 kg/m²     Constitutional: General appearance: healthy, well-nourished and well-developed   Psychiatric:  Orientation to time, place and person. Normal mood and affect and active, alert   Abdomen: Auscultation/Inspection/Palpation: Gravid. No tenderness or masses. Soft, nondistended   Extremities: no CC    FH: 33cm  FHT: " 150      Assessment/Plan  1. Rubella non-immune status, antepartum  2. Uterine scar from previous  delivery  3. 32 weeks gestation of pregnancy  Discussed that should any of the following symptoms occur during pregnancy, patient should contact the office immediately or go to the ER after business hours: spotting or bleeding, cramping, painful urination, severe vomiting, pain in one leg or calf, sudden gush of fluid, decrease or absence of fetal movement, sudden weight gain, periorbital or facial swelling, headache with visual changes and/or photophobia.       Discussed with patient travel precautions.       Discussed with patient on the uncertainty of COVID 19 in relation to pregnancy complications with patient and unborn fetus. Advised to CDC guidelines of social distancing, patient to stay home as much as possible, and limit contact with others. Travel restrictions discussed. Patient to call office if she has a temperature over 100.4 cough shortness of breath or GI symptoms. Patient educated if she feels she is in an emergency to call 911.        Educated patient it is strongly advised by [employer] as well as Society for Maternal Fetal [employer] Specialists to receive the COVID 19 vaccine. Educated patient that pregnant patients are at high risk of severe COVID 19 infections, including but not limited to death.     Educated on 3rd trimester labs   Follow up 2 weeks

## 2023-07-17 ENCOUNTER — APPOINTMENT (OUTPATIENT)
Dept: LAB | Facility: HOSPITAL | Age: 23
End: 2023-07-17
Attending: OBSTETRICS & GYNECOLOGY
Payer: MEDICAID

## 2023-07-17 LAB
ERYTHROCYTE [DISTWIDTH] IN BLOOD BY AUTOMATED COUNT: 11.9 % (ref 11–14.5)
HCT VFR BLD AUTO: 33.2 % (ref 36–48)
HGB BLD-MCNC: 11.5 G/DL (ref 11.8–16)
MCH RBC QN AUTO: 31.3 PG (ref 27–34)
MCHC RBC AUTO-ENTMCNC: 34.6 G/DL (ref 31–37)
MCV RBC AUTO: 90.2 FL (ref 79–99)
PLATELET # BLD AUTO: 140 X10(3)/MCL (ref 140–371)
PMV BLD AUTO: 11.2 FL (ref 9.4–12.4)
RBC # BLD AUTO: 3.68 X10(6)/MCL (ref 4–5.1)
T PALLIDUM AB SER QL: NONREACTIVE
WBC # SPEC AUTO: 7.76 X10(3)/MCL (ref 4–11.5)

## 2023-07-17 PROCEDURE — 86780 TREPONEMA PALLIDUM: CPT | Performed by: OBSTETRICS & GYNECOLOGY

## 2023-07-17 PROCEDURE — 36415 COLL VENOUS BLD VENIPUNCTURE: CPT

## 2023-07-17 PROCEDURE — 85027 COMPLETE CBC AUTOMATED: CPT

## 2023-07-17 NOTE — PROGRESS NOTES
"Chief Complaint:  Routine Prenatal Visit    History of Present Illness:  Freida Tmi is a 22 y.o. year old  at 34w6d presents for her ob exam. She is doing well. +fetal movement. Denies vaginal bleeding, vaginal discharge, LOF or contractions. Negative preeclampsia ROS.      Review of Systems:  General/Constitutional: Fever denies .    Skin: Rash denies.   Respiratory: Cough denies. SOB denies. Wheezing denies .   Cardiovascular: Chest pain denies. Dizziness denies. Palpitations denies. Swelling in hands/feet denies    Gastrointestinal: Abdominal pain denies. Constipation denies. Diarrhea denies. Heartburn denies. Nausea denies. Vomiting denies    Genitourinary: Painful urination denies.   Gynecologic: Vaginal bleeding denies. Vaginal discharge Normal. Leaking amniotic fluid denies. Contractions denies    Psychiatric: Depressed mood denies. Suicidal thoughts denies.     OB History    Para Term  AB Living   2 1 1 0 0 1   SAB IAB Ectopic Multiple Live Births   0 0 0 0 1      # 1 - Date: 21, Sex: Female, Weight: 2.637 kg (5 lb 13 oz), GA: 38w0d, Delivery: , Low Transverse, Apgar1: None, Apgar5: None, Living: Living, Birth Comments: None    # 2 - Date: None, Sex: None, Weight: None, GA: None, Delivery: None, Apgar1: None, Apgar5: None, Living: None, Birth Comments: None        Current Outpatient Medications:     PRENATAL VITAMIN 27 mg iron- 0.8 mg Tab, Take 1 tablet by mouth., Disp: , Rfl:       Physical Exam:  /64 (BP Location: Right arm, Patient Position: Sitting)   Temp 96.8 °F (36 °C)   Ht 5' 2.99" (1.6 m)   Wt 78.5 kg (173 lb 1 oz)   LMP 10/26/2022   BMI 30.67 kg/m²     Constitutional: General appearance: healthy, well-nourished and well-developed   Psychiatric:  Orientation to time, place and person. Normal mood and affect and active, alert   Abdomen: Auscultation/Inspection/Palpation: Gravid. No tenderness or masses. Soft, nondistended   Extremities: no " CCE    FH: 34cm  FHT: 150      Assessment/Plan  1. Benign gestational thrombocytopenia in third trimester  Educated    23 platelets: 140    Repeat CBC in 2 weeks    2. Rubella non-immune status, antepartum  3. Uterine scar from previous  delivery  4. 34 weeks gestation of pregnancy  Discussed that should any of the following symptoms occur during pregnancy, patient should contact the office immediately or go to the ER after business hours: spotting or bleeding, cramping, painful urination, severe vomiting, pain in one leg or calf, sudden gush of fluid, decrease or absence of fetal movement, sudden weight gain, periorbital or facial swelling, headache with visual changes and/or photophobia.       Discussed with patient travel precautions.       Discussed with patient on the uncertainty of COVID 19 in relation to pregnancy complications with patient and unborn fetus. Advised to CDC guidelines of social distancing, patient to stay home as much as possible, and limit contact with others. Travel restrictions discussed. Patient to call office if she has a temperature over 100.4 cough shortness of breath or GI symptoms. Patient educated if she feels she is in an emergency to call 911.        Educated patient it is strongly advised by [employer]ne[employer] as well as Society for Maternal Fetal [employer] Specialists to receive the COVID 19 vaccine. Educated patient that pregnant patients are at high risk of severe COVID 19 infections, including but not limited to death.     RTC 2 weeks for preadmit

## 2023-07-18 ENCOUNTER — ROUTINE PRENATAL (OUTPATIENT)
Dept: OBSTETRICS AND GYNECOLOGY | Facility: CLINIC | Age: 23
End: 2023-07-18
Payer: MEDICAID

## 2023-07-18 VITALS
BODY MASS INDEX: 30.66 KG/M2 | TEMPERATURE: 97 F | SYSTOLIC BLOOD PRESSURE: 112 MMHG | HEIGHT: 63 IN | DIASTOLIC BLOOD PRESSURE: 64 MMHG | WEIGHT: 173.06 LBS

## 2023-07-18 DIAGNOSIS — D69.6 BENIGN GESTATIONAL THROMBOCYTOPENIA IN THIRD TRIMESTER: ICD-10-CM

## 2023-07-18 DIAGNOSIS — Z28.39 RUBELLA NON-IMMUNE STATUS, ANTEPARTUM: ICD-10-CM

## 2023-07-18 DIAGNOSIS — N85.A UTERINE SCAR FROM PREVIOUS CESAREAN DELIVERY: ICD-10-CM

## 2023-07-18 DIAGNOSIS — O09.899 RUBELLA NON-IMMUNE STATUS, ANTEPARTUM: ICD-10-CM

## 2023-07-18 DIAGNOSIS — Z3A.34 34 WEEKS GESTATION OF PREGNANCY: ICD-10-CM

## 2023-07-18 DIAGNOSIS — O99.113 BENIGN GESTATIONAL THROMBOCYTOPENIA IN THIRD TRIMESTER: ICD-10-CM

## 2023-07-18 PROCEDURE — 99214 PR OFFICE/OUTPT VISIT, EST, LEVL IV, 30-39 MIN: ICD-10-PCS | Mod: TH,,, | Performed by: OBSTETRICS & GYNECOLOGY

## 2023-07-18 PROCEDURE — 99214 OFFICE O/P EST MOD 30 MIN: CPT | Mod: TH,,, | Performed by: OBSTETRICS & GYNECOLOGY

## 2023-07-19 ENCOUNTER — PATIENT MESSAGE (OUTPATIENT)
Dept: OTHER | Facility: OTHER | Age: 23
End: 2023-07-19
Payer: MEDICAID

## 2023-07-31 NOTE — PROGRESS NOTES
Chief Complaint:  Routine Prenatal Visit    History of Present Illness:  Freida Tim is a 22 y.o. year old  at 36w6d presents for preadmit ob. She is doing well. +fetal movement. Denies vaginal bleeding, vaginal discharge, LOF or contractions. Negative preeclampsia ROS. C/o pelvic pressure.      Review of Systems:  General/Constitutional: Fever denies .    Skin: Rash denies.   Respiratory: Cough denies. SOB denies. Wheezing denies .   Cardiovascular: Chest pain denies. Dizziness denies. Palpitations denies. Swelling in hands/feet denies    Gastrointestinal: Abdominal pain denies. Constipation denies. Diarrhea denies. Heartburn denies. Nausea denies. Vomiting denies    Genitourinary: Painful urination denies.   Gynecologic: Vaginal bleeding denies. Vaginal discharge Normal. Leaking amniotic fluid denies. Contractions denies    Psychiatric: Depressed mood denies. Suicidal thoughts denies.     OB History    Para Term  AB Living   2 1 1 0 0 1   SAB IAB Ectopic Multiple Live Births   0 0 0 0 1      # 1 - Date: 21, Sex: Female, Weight: 2.637 kg (5 lb 13 oz), GA: 38w0d, Delivery: , Low Transverse, Apgar1: None, Apgar5: None, Living: Living, Birth Comments: None    # 2 - Date: None, Sex: None, Weight: None, GA: None, Delivery: None, Apgar1: None, Apgar5: None, Living: None, Birth Comments: None      Past Medical History:   Diagnosis Date    Rubella non-immune status, antepartum 2023       Current Outpatient Medications:     PRENATAL VITAMIN 27 mg iron- 0.8 mg Tab, Take 1 tablet by mouth., Disp: , Rfl:     Review of patient's allergies indicates:  No Known Allergies    Past Surgical History:   Procedure Laterality Date     SECTION  2021     History reviewed. No pertinent family history.    Social History     Socioeconomic History    Marital status: Single   Tobacco Use    Smoking status: Never    Smokeless tobacco: Never   Substance and Sexual Activity    Alcohol  "use: Not Currently    Drug use: Never    Sexual activity: Yes     Partners: Male     Comment: pregnant       Physical Exam:  /70 (BP Location: Right arm, Patient Position: Sitting)   Temp 97.5 °F (36.4 °C)   Ht 5' 2.99" (1.6 m)   Wt 79.2 kg (174 lb 9.6 oz)   LMP 10/26/2022   BMI 30.94 kg/m²     Constitutional: General appearance: healthy, well-nourished and well-developed   Psychiatric:  Orientation to time, place and person. Normal mood and affect and active, alert   Abdomen: Auscultation/Inspection/Palpation: No tenderness or masses. Soft, nondistended   Extremities: no CCE    Cxv: /-3  FH: 36cm  FHT: 150      Assessment/Plan:  Threatened  labor  Educated  First celestone today  Pt to [facility]LL[facility] for monitoring  PO hydration  PTL precautions    2. Benign gestational thrombocytopenia in third trimester  Educated     23 platelets: 140     Repeat CBC     3. Rubella non-immune status, antepartum  4. Uterine scar from previous  delivery  5. 36 weeks gestation of pregnancy   Discussed that should any of the following symptoms occur during pregnancy, patient should contact the office immediately or go to the ER after business hours: spotting or bleeding, cramping, painful urination, severe vomiting, pain in one leg or calf, sudden gush of fluid, decrease or absence of fetal movement, sudden weight gain, periorbital or facial swelling, headache with visual changes and/or photophobia.       Discussed with patient travel precautions.       Discussed with patient on the uncertainty of COVID 19 in relation to pregnancy complications with patient and unborn fetus. Advised to CDC guidelines of social distancing, patient to stay home as much as possible, and limit contact with others. Travel restrictions discussed. Patient to call office if she has a temperature over 100.4 cough shortness of breath or GI symptoms. Patient educated if she feels she is in an emergency to call 911.        Educated patient " it is strongly advised by [employer] of [employer]ne[employer] as well as Society for Maternal Fetal [employer] Specialists to receive the COVID 19 vaccine. Educated patient that pregnant patients are at high risk of severe COVID 19 infections, including but not limited to death.     Discussed risks and complications of vaginal delivery and . Reviewed delivery process including induction, c/s and anesthesia. Explained preadmit process. Gave labor precautions and advised to go to OB unit if rupture of membranes occurs, pt. experiences bleeding, contractions get closer than five minutes apart, or pt. notices lack of or decrease in fetal movement. Pt. voices understanding and gives informed consent.       GBS and gc/cz/tv collected  Section at 39wks, 23  Follow up 1 week

## 2023-08-01 ENCOUNTER — HOSPITAL ENCOUNTER (OUTPATIENT)
Facility: HOSPITAL | Age: 23
Discharge: HOME | End: 2023-08-01
Attending: OBSTETRICS & GYNECOLOGY | Admitting: OBSTETRICS & GYNECOLOGY
Payer: MEDICAID

## 2023-08-01 ENCOUNTER — ROUTINE PRENATAL (OUTPATIENT)
Dept: OBSTETRICS AND GYNECOLOGY | Facility: CLINIC | Age: 23
End: 2023-08-01
Payer: MEDICAID

## 2023-08-01 VITALS
TEMPERATURE: 98 F | BODY MASS INDEX: 30.94 KG/M2 | SYSTOLIC BLOOD PRESSURE: 116 MMHG | HEIGHT: 63 IN | WEIGHT: 174.63 LBS | DIASTOLIC BLOOD PRESSURE: 70 MMHG

## 2023-08-01 DIAGNOSIS — O47.03 THREATENED PREMATURE LABOR IN THIRD TRIMESTER: ICD-10-CM

## 2023-08-01 DIAGNOSIS — D69.6 BENIGN GESTATIONAL THROMBOCYTOPENIA IN THIRD TRIMESTER: Primary | ICD-10-CM

## 2023-08-01 DIAGNOSIS — Z3A.36 36 WEEKS GESTATION OF PREGNANCY: ICD-10-CM

## 2023-08-01 DIAGNOSIS — O09.899 RUBELLA NON-IMMUNE STATUS, ANTEPARTUM: ICD-10-CM

## 2023-08-01 DIAGNOSIS — Z28.39 RUBELLA NON-IMMUNE STATUS, ANTEPARTUM: ICD-10-CM

## 2023-08-01 DIAGNOSIS — O34.219 UTERINE SCAR FROM PREVIOUS CESAREAN DELIVERY, ANTEPARTUM: ICD-10-CM

## 2023-08-01 DIAGNOSIS — O99.113 BENIGN GESTATIONAL THROMBOCYTOPENIA IN THIRD TRIMESTER: Primary | ICD-10-CM

## 2023-08-01 DIAGNOSIS — N85.A UTERINE SCAR FROM PREVIOUS CESAREAN DELIVERY: ICD-10-CM

## 2023-08-01 LAB
BILIRUB UR QL STRIP: ABNORMAL
GLUCOSE UR QL STRIP: NEGATIVE
KETONES UR QL STRIP: ABNORMAL
LEUKOCYTE ESTERASE UR QL STRIP: NEGATIVE
PH, POC UA: ABNORMAL
POC BLOOD, URINE: ABNORMAL
POC NITRATES, URINE: NEGATIVE
PROT UR QL STRIP: NEGATIVE
SP GR UR STRIP: ABNORMAL (ref 1–1.03)
UROBILINOGEN UR STRIP-ACNC: ABNORMAL (ref 0.3–2.2)

## 2023-08-01 PROCEDURE — 99214 PR OFFICE/OUTPT VISIT, EST, LEVL IV, 30-39 MIN: ICD-10-PCS | Mod: 25,TH,, | Performed by: OBSTETRICS & GYNECOLOGY

## 2023-08-01 PROCEDURE — 99214 OFFICE O/P EST MOD 30 MIN: CPT | Mod: 25,TH,, | Performed by: OBSTETRICS & GYNECOLOGY

## 2023-08-01 RX ORDER — CARBOPROST TROMETHAMINE 250 UG/ML
250 INJECTION, SOLUTION INTRAMUSCULAR
Status: CANCELLED | OUTPATIENT
Start: 2023-08-01

## 2023-08-01 RX ORDER — METHYLERGONOVINE MALEATE 0.2 MG/ML
200 INJECTION INTRAVENOUS
Status: CANCELLED | OUTPATIENT
Start: 2023-08-01

## 2023-08-01 RX ORDER — OXYTOCIN/RINGER'S LACTATE 30/500 ML
95 PLASTIC BAG, INJECTION (ML) INTRAVENOUS ONCE
Status: CANCELLED | OUTPATIENT
Start: 2023-08-01 | End: 2023-08-01

## 2023-08-01 RX ORDER — MUPIROCIN 20 MG/G
OINTMENT TOPICAL
Status: CANCELLED | OUTPATIENT
Start: 2023-08-01

## 2023-08-01 RX ORDER — FAMOTIDINE 10 MG/ML
20 INJECTION INTRAVENOUS
Status: CANCELLED | OUTPATIENT
Start: 2023-08-01

## 2023-08-01 RX ORDER — OXYTOCIN/RINGER'S LACTATE 30/500 ML
334 PLASTIC BAG, INJECTION (ML) INTRAVENOUS ONCE
Status: CANCELLED | OUTPATIENT
Start: 2023-08-01 | End: 2023-08-01

## 2023-08-01 RX ORDER — SODIUM CHLORIDE, SODIUM LACTATE, POTASSIUM CHLORIDE, CALCIUM CHLORIDE 600; 310; 30; 20 MG/100ML; MG/100ML; MG/100ML; MG/100ML
INJECTION, SOLUTION INTRAVENOUS CONTINUOUS
Status: CANCELLED | OUTPATIENT
Start: 2023-08-01

## 2023-08-01 RX ORDER — MISOPROSTOL 100 UG/1
1000 TABLET ORAL
Status: CANCELLED | OUTPATIENT
Start: 2023-08-01

## 2023-08-01 RX ORDER — SODIUM CITRATE AND CITRIC ACID MONOHYDRATE 334; 500 MG/5ML; MG/5ML
30 SOLUTION ORAL
Status: CANCELLED | OUTPATIENT
Start: 2023-08-01

## 2023-08-01 RX ORDER — BETAMETHASONE SODIUM PHOSPHATE AND BETAMETHASONE ACETATE 3; 3 MG/ML; MG/ML
12 INJECTION, SUSPENSION INTRA-ARTICULAR; INTRALESIONAL; INTRAMUSCULAR; SOFT TISSUE
Status: COMPLETED | OUTPATIENT
Start: 2023-08-01 | End: 2023-08-01

## 2023-08-01 RX ADMIN — BETAMETHASONE SODIUM PHOSPHATE AND BETAMETHASONE ACETATE 12 MG: 3; 3 INJECTION, SUSPENSION INTRA-ARTICULAR; INTRALESIONAL; INTRAMUSCULAR; SOFT TISSUE at 04:08

## 2023-08-01 NOTE — NURSING
"1420-pt sent from dr julian's office. Dr julian states she would like to send pt to rule out labor. Pt reports perineal pressure, pt denies any consistent contractions but states "she has some irregular ones here and there." Pt denies any bleeding, leaking of fluid or further complaints at this time. Pt reports active fetal movement.     1430-pt placed on efm/toco. Vitals taken. Plan of care discussed. Pt verbalized understanding. PO hydration provided.     1605-sve performed. No cervical change from previous assessment. Pt states "I'm not really feeling contractions, I'm feeling better." Pt reports decrease in pain.     1610-spoke with dr julian per phone to give status update. Orders given to d/c pt home with labor precautions. Pt to keep scheduled follow up appointment with dr julian next week.     1620-written and verbal discharge education provided to pt. Allowed time for questions. Pt verbalized understanding.    Pt ambulated off unit in no distress.   "

## 2023-08-02 LAB — STREP B PCR (OHS): NOT DETECTED

## 2023-08-03 LAB
C TRACH RRNA SPEC QL NAA+PROBE: NEGATIVE
N GONORRHOEA RRNA SPEC QL NAA+PROBE: NEGATIVE
SPECIMEN SOURCE: NORMAL
T VAGINALIS RRNA SPEC QL NAA+PROBE: NEGATIVE

## 2023-08-05 ENCOUNTER — HOSPITAL ENCOUNTER (OUTPATIENT)
Facility: HOSPITAL | Age: 23
Discharge: HOME | End: 2023-08-05
Attending: OBSTETRICS & GYNECOLOGY | Admitting: OBSTETRICS & GYNECOLOGY
Payer: MEDICAID

## 2023-08-05 VITALS
TEMPERATURE: 98 F | HEART RATE: 86 BPM | DIASTOLIC BLOOD PRESSURE: 67 MMHG | SYSTOLIC BLOOD PRESSURE: 113 MMHG | RESPIRATION RATE: 18 BRPM

## 2023-08-05 DIAGNOSIS — O26.899 PELVIC PAIN IN PREGNANCY: ICD-10-CM

## 2023-08-05 DIAGNOSIS — R10.2 PELVIC PAIN IN PREGNANCY: ICD-10-CM

## 2023-08-05 LAB
APPEARANCE UR: CLEAR
BILIRUB UR QL STRIP.AUTO: NEGATIVE
COLOR UR: YELLOW
GLUCOSE UR QL STRIP.AUTO: NEGATIVE
KETONES UR QL STRIP.AUTO: NEGATIVE
LEUKOCYTE ESTERASE UR QL STRIP.AUTO: NEGATIVE
NITRITE UR QL STRIP.AUTO: NEGATIVE
PH UR STRIP.AUTO: 7 [PH]
PROT UR QL STRIP.AUTO: NEGATIVE
RBC UR QL AUTO: NEGATIVE
SP GR UR STRIP.AUTO: 1.01
UROBILINOGEN UR STRIP-ACNC: 2

## 2023-08-05 PROCEDURE — 81003 URINALYSIS AUTO W/O SCOPE: CPT | Performed by: OBSTETRICS & GYNECOLOGY

## 2023-08-05 PROCEDURE — 63600175 PHARM REV CODE 636 W HCPCS: Performed by: OBSTETRICS & GYNECOLOGY

## 2023-08-05 RX ADMIN — SODIUM CHLORIDE, POTASSIUM CHLORIDE, SODIUM LACTATE AND CALCIUM CHLORIDE 1000 ML: 600; 310; 30; 20 INJECTION, SOLUTION INTRAVENOUS at 08:08

## 2023-08-05 NOTE — NURSING
"1730-pt ambulated to unit. Pt c/o pelvic pressure "that has gotten much worse." Pt states she is unsure if she is having any contractions." Pt also reports decreased fetal movement from normal today. Pt denies any bleeding, leaking of fluid, or further complaints at this time. Plan of care discussed. Pt verbalized understanding.    1735-pt urine collected. pt placed on efm/toco. Sve performed. Will continue to monitor.     1845-report given to leydi Perez rn   "

## 2023-08-06 NOTE — NURSING
1915- IRREGULAR CTX NOTED, PT DENIES FEELING CONTRACTIONS, REPORTS PELVIC PAIN HAS IMPROVED SINCE ARRIVAL. NST REACTIVE. SVE DONE, NO CERVICAL CHANGE NOTED FROM PREVIOUS EXAM. REPORTS ACTIVE FETAL MOVEMENT AT THIS TIME.    1920- DR BATES NOTIFIED OF PT ARRIVAL REASON FOR VISIT, UPDATED ON EFM, SVE, VITALS. NEW ORDERS NOTED FOR BPP, IV FLUID BOLUS. MAY DISCHARGE HOME AFTER INTERVENTIONS IF STABLE.    2015- BPP 8/8, LYLA 12.6 PER REPORT FROM RADIOLOGY. IV FLUID BOLUS STARTED.    2125- REVIEWED DISCHARGE INSTRUCTIONS AND REASONS TO RETURN TO UNIT, PT VERBALIZED UNDERSTANDING. INSTRUCTED TO KEEP NEXT OB APPT AS SCHEDULED. DISCHARGED HOME IN STABLE CONDITION.

## 2023-08-07 NOTE — PROGRESS NOTES
"Chief Complaint:  Routine Prenatal Visit (37.6 weeks)    History of Present Illness:  Freida Tim is a 22 y.o. year old  at 37w6d presents for her ob exam. She is doing well. +fetal movement. Denies vaginal bleeding, vaginal discharge, LOF or contractions. Negative preeclampsia ROS. Desires cervical check.      Review of Systems:  General/Constitutional: Fever denies .    Skin: Rash denies.   Respiratory: Cough denies. SOB denies. Wheezing denies .   Cardiovascular: Chest pain denies. Dizziness denies. Palpitations denies. Swelling in hands/feet denies    Gastrointestinal: Abdominal pain denies. Constipation denies. Diarrhea denies. Heartburn denies. Nausea denies. Vomiting denies    Genitourinary: Painful urination denies.   Gynecologic: Vaginal bleeding denies. Vaginal discharge Normal. Leaking amniotic fluid denies. Contractions denies    Psychiatric: Depressed mood denies. Suicidal thoughts denies.     OB History    Para Term  AB Living   2 1 1 0 0 1   SAB IAB Ectopic Multiple Live Births   0 0 0 0 1      # 1 - Date: 21, Sex: Female, Weight: 2.637 kg (5 lb 13 oz), GA: 38w0d, Delivery: , Low Transverse, Apgar1: None, Apgar5: None, Living: Living, Birth Comments: None    # 2 - Date: None, Sex: None, Weight: None, GA: None, Delivery: None, Apgar1: None, Apgar5: None, Living: None, Birth Comments: None        Current Outpatient Medications:     PRENATAL VITAMIN 27 mg iron- 0.8 mg Tab, Take 1 tablet by mouth., Disp: , Rfl:       Physical Exam:  BP (!) 106/56 (BP Location: Right arm, Patient Position: Sitting, BP Method: Medium (Manual))   Temp 97.7 °F (36.5 °C) (Temporal)   Ht 5' 2.99" (1.6 m)   Wt 79.5 kg (175 lb 3.2 oz)   LMP 10/26/2022   Breastfeeding No   BMI 31.05 kg/m²     Constitutional: General appearance: healthy, well-nourished and well-developed   Psychiatric:  Orientation to time, place and person. Normal mood and affect and active, alert   Abdomen: " Auscultation/Inspection/Palpation: Gravid. No tenderness or masses. Soft, nondistended   Extremities: no CCE    Cvx: 1-2/50/-3, firm  FH: 37cm  FHT: 150      Assessment/Plan  1. Benign gestational thrombocytopenia in third trimester  Educated     23 platelets: 140     Repeat CBC     2. Rubella non-immune status, antepartum  3. Uterine scar from previous  delivery  4. 37 weeks gestation of pregnancy  Discussed that should any of the following symptoms occur during pregnancy, patient should contact the office immediately or go to the ER after business hours: spotting or bleeding, cramping, painful urination, severe vomiting, pain in one leg or calf, sudden gush of fluid, decrease or absence of fetal movement, sudden weight gain, periorbital or facial swelling, headache with visual changes and/or photophobia.       Discussed with patient travel precautions.       Discussed with patient on the uncertainty of COVID 19 in relation to pregnancy complications with patient and unborn fetus. Advised to CDC guidelines of social distancing, patient to stay home as much as possible, and limit contact with others. Travel restrictions discussed. Patient to call office if she has a temperature over 100.4 cough shortness of breath or GI symptoms. Patient educated if she feels she is in an emergency to call 911.        Educated patient it is strongly advised by [employer] Ob[employer] Gyne[employer] as well as Society for Maternal Fetal [employer] Specialists to receive the COVID 19 vaccine. Educated patient that pregnant patients are at high risk of severe COVID 19 infections, including but not limited to death.     Educated on CBC, RPR  RTC 1 week

## 2023-08-08 ENCOUNTER — ROUTINE PRENATAL (OUTPATIENT)
Dept: OBSTETRICS AND GYNECOLOGY | Facility: CLINIC | Age: 23
End: 2023-08-08
Payer: MEDICAID

## 2023-08-08 VITALS
SYSTOLIC BLOOD PRESSURE: 106 MMHG | BODY MASS INDEX: 31.04 KG/M2 | HEIGHT: 63 IN | WEIGHT: 175.19 LBS | TEMPERATURE: 98 F | DIASTOLIC BLOOD PRESSURE: 56 MMHG

## 2023-08-08 DIAGNOSIS — O34.219 UTERINE SCAR FROM PREVIOUS CESAREAN DELIVERY, ANTEPARTUM: ICD-10-CM

## 2023-08-08 DIAGNOSIS — Z3A.37 37 WEEKS GESTATION OF PREGNANCY: ICD-10-CM

## 2023-08-08 DIAGNOSIS — O99.113 BENIGN GESTATIONAL THROMBOCYTOPENIA IN THIRD TRIMESTER: Primary | ICD-10-CM

## 2023-08-08 DIAGNOSIS — O09.899 RUBELLA NON-IMMUNE STATUS, ANTEPARTUM: ICD-10-CM

## 2023-08-08 DIAGNOSIS — D69.6 BENIGN GESTATIONAL THROMBOCYTOPENIA IN THIRD TRIMESTER: Primary | ICD-10-CM

## 2023-08-08 DIAGNOSIS — N85.A UTERINE SCAR FROM PREVIOUS CESAREAN DELIVERY: ICD-10-CM

## 2023-08-08 DIAGNOSIS — Z28.39 RUBELLA NON-IMMUNE STATUS, ANTEPARTUM: ICD-10-CM

## 2023-08-08 LAB
BILIRUB UR QL STRIP: ABNORMAL
GLUCOSE UR QL STRIP: NEGATIVE
KETONES UR QL STRIP: ABNORMAL
LEUKOCYTE ESTERASE UR QL STRIP: NEGATIVE
PH, POC UA: ABNORMAL
POC BLOOD, URINE: ABNORMAL
POC NITRATES, URINE: NEGATIVE
PROT UR QL STRIP: POSITIVE
SP GR UR STRIP: ABNORMAL (ref 1–1.03)
UROBILINOGEN UR STRIP-ACNC: ABNORMAL (ref 0.3–2.2)

## 2023-08-08 PROCEDURE — 99214 OFFICE O/P EST MOD 30 MIN: CPT | Mod: TH,,, | Performed by: OBSTETRICS & GYNECOLOGY

## 2023-08-08 PROCEDURE — 99214 PR OFFICE/OUTPT VISIT, EST, LEVL IV, 30-39 MIN: ICD-10-PCS | Mod: TH,,, | Performed by: OBSTETRICS & GYNECOLOGY

## 2023-08-10 ENCOUNTER — HOSPITAL ENCOUNTER (OUTPATIENT)
Dept: PREADMISSION TESTING | Facility: HOSPITAL | Age: 23
Discharge: HOME | End: 2023-08-10
Payer: MEDICAID

## 2023-08-10 DIAGNOSIS — Z3A.37 37 WEEKS GESTATION OF PREGNANCY: Primary | ICD-10-CM

## 2023-08-10 LAB
BASOPHILS # BLD AUTO: 0.02 X10(3)/MCL (ref 0.01–0.08)
BASOPHILS NFR BLD AUTO: 0.3 % (ref 0.1–1.2)
EOSINOPHIL # BLD AUTO: 0.04 X10(3)/MCL (ref 0.04–0.36)
EOSINOPHIL NFR BLD AUTO: 0.5 % (ref 0.7–7)
ERYTHROCYTE [DISTWIDTH] IN BLOOD BY AUTOMATED COUNT: 12.4 % (ref 11–14.5)
HBV SURFACE AG SERPL QL IA: NEGATIVE
HBV SURFACE AG SERPL QL IA: NORMAL
HCT VFR BLD AUTO: 33.5 % (ref 36–48)
HGB BLD-MCNC: 11.7 G/DL (ref 11.8–16)
IMM GRANULOCYTES # BLD AUTO: 0.06 X10(3)/MCL (ref 0–0.03)
IMM GRANULOCYTES NFR BLD AUTO: 0.8 % (ref 0–0.5)
LYMPHOCYTES # BLD AUTO: 1.77 X10(3)/MCL (ref 1.16–3.74)
LYMPHOCYTES NFR BLD AUTO: 23.2 % (ref 20–55)
MCH RBC QN AUTO: 31.7 PG (ref 27–34)
MCHC RBC AUTO-ENTMCNC: 34.9 G/DL (ref 31–37)
MCV RBC AUTO: 90.8 FL (ref 79–99)
MONOCYTES # BLD AUTO: 0.55 X10(3)/MCL (ref 0.24–0.36)
MONOCYTES NFR BLD AUTO: 7.2 % (ref 4.7–12.5)
NEUTROPHILS # BLD AUTO: 5.19 X10(3)/MCL (ref 1.56–6.13)
NEUTROPHILS NFR BLD AUTO: 68 % (ref 37–73)
NRBC BLD AUTO-RTO: 0 %
PLATELET # BLD AUTO: 148 X10(3)/MCL (ref 140–371)
PMV BLD AUTO: 11.7 FL (ref 9.4–12.4)
RBC # BLD AUTO: 3.69 X10(6)/MCL (ref 4–5.1)
WBC # SPEC AUTO: 7.63 X10(3)/MCL (ref 4–11.5)

## 2023-08-10 PROCEDURE — 86780 TREPONEMA PALLIDUM: CPT | Performed by: OBSTETRICS & GYNECOLOGY

## 2023-08-10 PROCEDURE — 85025 COMPLETE CBC W/AUTO DIFF WBC: CPT | Performed by: OBSTETRICS & GYNECOLOGY

## 2023-08-10 PROCEDURE — 87389 HIV-1 AG W/HIV-1&-2 AB AG IA: CPT | Performed by: OBSTETRICS & GYNECOLOGY

## 2023-08-10 PROCEDURE — 87340 HEPATITIS B SURFACE AG IA: CPT | Performed by: OBSTETRICS & GYNECOLOGY

## 2023-08-10 NOTE — DISCHARGE INSTRUCTIONS
OB Pre-Operative Instructions    You are scheduled for an operative procedure on AUGUST 17 at 8:46 am.  Please report to the OB department at 6:45 am on the day of your procedure.     Please follow the instructions listed below.     Should you develop a cold, temperature, sore throat, cough, or any other indication of illness prior to your scheduled procedure, please notify your physician.    DO NOT EAT OR DRINK ANYTHING AFTER 12:00 MIDNIGHT the night before your surgery or the morning of your procedure until after your procedure is completed and instructed by your nurse. This includes coffee, water, gum, and mints. You may brush your teeth, but do not swallow any water. Do not use any tobacco products the morning of the procedure (including dips, chews, smoking).    Please bathe and shampoo your hair before coming to the hospital.    Dress casually and wear loose, comfortable clothing. Please remove all make-up and nail polish. You will be given a patient gown for surgery. You must remove all undergarments, jewelry, and dentures unless instructed otherwise. Please leave all valuables at home or in the care of your family.    Any questions or further information should be directed to the out-patient nurse at [phone] between the hours of 8:00 a.m. and 3:00 p.m., Monday through Friday.    PATIENT PRE-OP INSTRUCTIONS:    Dear patient:    To help prevent an infection after your surgery, you will be given 2 towelettes to use the night before your surgery after you shower. Following these instructions carefully may help prevent an infection after your procedure.     Shower: You must shower the evening before your surgery!  A shower is best because it helps to wash away the dirt and germs.  A bath is acceptable if a shower is not available.  Be sure to rinse thoroughly with clean water.     1.  Do not shave the area of your body where surgery will be done.   2.  Wash with regular soap and water and rinse off. (If  bathing change the water.)  3.  Shampoo with your regular shampoo and rinse your hair.   4.  Use a clean towel to dry off your body.   5.  Do not use lotion, cream, or powder afterward.   6.  See instructions for towelettes and follow.   7.  Put on clean clothing and be sure to have clean sheets on your bed.   8.  Wear clean clothes the day of your procedure.     Rayray 2% Chlorhexidine Gluconate* Clothes:  On the evening before your surgery after your shower follow these instructions:  Once dry after showering vigorously  wipe the area where your surgery will be performed with a Rayray cloth for approximately 3 minutes. Dispose of the first cloth and allow area to air dry for 1 minute. Do not rinse or dry off area.  Use the second cloth to wipe off the rest of your body excluding your face and perineal/buttock areas.  Dispose of cloth and allow area to air dry. Do not rinse.     Condition of Skin:  Please tell your nurse if you are having any issues with your skin.  Any rash, scratch, or break in the skin including insect bites should be reported. Let your nurse know as soon as you arrive.     Prevent infection:  Maintaining good hygiene is very important in infection prevention.  Handwashing is especially helpful.  Cleaning your hands with an alcohol based hand  is also effective.  Please be sure to follow your doctor's instructions in the pre and post operative period!     Things to Bring to the Hospital    To help make your stay as comfortable as possible you should bring the following items when you come to the hospital:    TOOTHBRUSH  TOOTHPASTE  SHAMPOO/CONDITIONER  DEODORANT  HAIRBRUSH OR COMB  UNDERWEAR  SOAP  SANITARY NAPKINS-OVERNIGHTS (This item is optional unless you have a preference for the type you use.)  NURSING PADS  BREAST PUMP IF YOU OWN ONE    Other items needed for both mom and baby include:    Bras (2-3).  These should be ones that were worn during pregnancy or nursing bras for  breastfeeding.   Gowns, slippers, and a robe.   One outfit of baby clothes to take the baby home in as well as a blanket. The hospital will provide baby clothes, blankets, diapers, and wipes during the hospital stay.   Federally approved car seat is required for the infant to go home.    OB VISITATION POLICY    The OB Department is open to family for patient visitation.  Smoking is not allowed on the premises.  Visitors must check with staff before entering a patient's room if there is a sign posted on the door or in the crocker.   Visitors may be in the room with baby if they are free from infection or any signs and symptoms of illness.  All visitors must use hand  or soap and water before touching the baby.   No children under age 12 are allowed to sleep overnight or to be left unattended in room.   Our rooms only accommodate one overnight guest.  While in labor and delivery room, you will be allowed 2 support persons if you choose and one additional guest will be allowed at the discretion of the physician or nurse.   The 2-3 labor and delivery support people may be interchangeable at the discretion of the OB nurse in charge of your care.   Any other visitors will be directed to the waiting room or post-partum room until after delivery.   No one will be allowed to stay in the labor and delivery hallway.  After delivery, visitors will not be limited unless a procedure is in progress or your conditions warrants it.   Every day between 1:00 and 3:00 pm visitors are discouraged from entering the OB unit to encourage a mother/baby rest and bonding time.   Videos and photographs are not allowed to be taken during the delivery process. They may be taken after the baby is born and declared in good condition by nursing staff or physician.   If you will be having a delivery by , you will be allowed (1) support person in operating room unless the procedure is an emergency or under general anesthesia. No video  or photography is allowed until after the procedure is complete.  The support person present will be asked to leave the operating room with the baby after delivery or if any problems arise.   Family and friends should be made familiar with the visitor policy for our facility, so that we may insure that the safety and well-being of you and your baby during this very important time.

## 2023-08-10 NOTE — PRE-PROCEDURE INSTRUCTIONS
OB pre-admit instructions given for upcoming delivery. Patient states understanding of information. Encouraged any questions and concerns. Lab work completed. Patient preference includes breast/bottle feeding and would like the doctor on call to take care of infant during hospital stay.

## 2023-08-11 LAB
HIV 1+2 AB+HIV1 P24 AG SERPL QL IA: NONREACTIVE
T PALLIDUM AB SER QL: NONREACTIVE

## 2023-08-16 ENCOUNTER — ANESTHESIA EVENT (OUTPATIENT)
Dept: SURGERY | Facility: HOSPITAL | Age: 23
End: 2023-08-16
Payer: MEDICAID

## 2023-08-16 NOTE — ANESTHESIA PREPROCEDURE EVALUATION
08/16/2023  Freida Tim is a 22 y.o., female.      Pre-op Assessment    I have reviewed the Patient Summary Reports.     I have reviewed the Nursing Notes. I have reviewed the NPO Status.   I have reviewed the Medications.     Review of Systems  Anesthesia Hx:  No problems with previous Anesthesia  Denies Family Hx of Anesthesia complications.   Denies Personal Hx of Anesthesia complications.   Hematology/Oncology:     Oncology Normal    -- Anemia:   EENT/Dental:EENT/Dental Normal   Cardiovascular:  Cardiovascular Normal Exercise tolerance: good     Pulmonary:  Pulmonary Normal    Renal/:  Renal/ Normal     Hepatic/GI:  Hepatic/GI Normal    Musculoskeletal:  Musculoskeletal Normal    Neurological:  Neurology Normal    Endocrine:  Endocrine Normal  Obesity / BMI > 30  Dermatological:  Skin Normal    Psych:  Psychiatric Normal           Physical Exam  General: Well nourished, Cooperative, Alert and Oriented    Airway:  Mallampati: II / II  Mouth Opening: Normal  TM Distance: Normal  Tongue: Normal  Neck ROM: Normal ROM    Dental:  Intact        Anesthesia Plan  Type of Anesthesia, risks & benefits discussed:    Anesthesia Type: Spinal  Intra-op Monitoring Plan: Standard ASA Monitors  Post Op Pain Control Plan: multimodal analgesia  Induction:  IV  Informed Consent: Informed consent signed with the Patient and all parties understand the risks and agree with anesthesia plan.  All questions answered. Patient consented to blood products? Yes  ASA Score: 2  Day of Surgery Review of History & Physical: H&P Update referred to the surgeon/provider.I have interviewed and examined the patient. I have reviewed the patient's H&P dated: There are no significant changes.     Ready For Surgery From Anesthesia Perspective.     .

## 2023-08-17 ENCOUNTER — ANESTHESIA (OUTPATIENT)
Dept: SURGERY | Facility: HOSPITAL | Age: 23
End: 2023-08-17
Payer: MEDICAID

## 2023-08-17 ENCOUNTER — HOSPITAL ENCOUNTER (INPATIENT)
Facility: HOSPITAL | Age: 23
LOS: 2 days | Discharge: HOME | End: 2023-08-19
Attending: OBSTETRICS & GYNECOLOGY | Admitting: OBSTETRICS & GYNECOLOGY
Payer: MEDICAID

## 2023-08-17 DIAGNOSIS — D69.6 BENIGN GESTATIONAL THROMBOCYTOPENIA IN THIRD TRIMESTER: ICD-10-CM

## 2023-08-17 DIAGNOSIS — N85.A UTERINE SCAR FROM PREVIOUS CESAREAN DELIVERY: ICD-10-CM

## 2023-08-17 DIAGNOSIS — O99.113 BENIGN GESTATIONAL THROMBOCYTOPENIA IN THIRD TRIMESTER: ICD-10-CM

## 2023-08-17 DIAGNOSIS — O34.219 UTERINE SCAR FROM PREVIOUS CESAREAN DELIVERY, ANTEPARTUM: Primary | ICD-10-CM

## 2023-08-17 LAB
ABO AND RH: NORMAL
ANTIBODY SCREEN: NORMAL
BASOPHILS # BLD AUTO: 0.03 X10(3)/MCL (ref 0.01–0.08)
BASOPHILS NFR BLD AUTO: 0.4 % (ref 0.1–1.2)
EOSINOPHIL # BLD AUTO: 0.03 X10(3)/MCL (ref 0.04–0.36)
EOSINOPHIL NFR BLD AUTO: 0.4 % (ref 0.7–7)
ERYTHROCYTE [DISTWIDTH] IN BLOOD BY AUTOMATED COUNT: 12.6 % (ref 11–14.5)
HCT VFR BLD AUTO: 32.6 % (ref 36–48)
HGB BLD-MCNC: 11.1 G/DL (ref 11.8–16)
IMM GRANULOCYTES # BLD AUTO: 0.04 X10(3)/MCL (ref 0–0.03)
IMM GRANULOCYTES NFR BLD AUTO: 0.5 % (ref 0–0.5)
LYMPHOCYTES # BLD AUTO: 1.63 X10(3)/MCL (ref 1.16–3.74)
LYMPHOCYTES NFR BLD AUTO: 21.9 % (ref 20–55)
MCH RBC QN AUTO: 31.4 PG (ref 27–34)
MCHC RBC AUTO-ENTMCNC: 34 G/DL (ref 31–37)
MCV RBC AUTO: 92.1 FL (ref 79–99)
MONOCYTES # BLD AUTO: 0.53 X10(3)/MCL (ref 0.24–0.36)
MONOCYTES NFR BLD AUTO: 7.1 % (ref 4.7–12.5)
NEUTROPHILS # BLD AUTO: 5.18 X10(3)/MCL (ref 1.56–6.13)
NEUTROPHILS NFR BLD AUTO: 69.7 % (ref 37–73)
NRBC BLD AUTO-RTO: 0 %
PLATELET # BLD AUTO: 124 X10(3)/MCL (ref 140–371)
PMV BLD AUTO: 11.8 FL (ref 9.4–12.4)
RBC # BLD AUTO: 3.54 X10(6)/MCL (ref 4–5.1)
SPECIMEN OUTDATE: NORMAL
WBC # SPEC AUTO: 7.44 X10(3)/MCL (ref 4–11.5)

## 2023-08-17 PROCEDURE — 25000003 PHARM REV CODE 250: Performed by: NURSE ANESTHETIST, CERTIFIED REGISTERED

## 2023-08-17 PROCEDURE — 59514 PR CESAREAN DELIVERY ONLY: ICD-10-PCS | Mod: GB,,, | Performed by: OBSTETRICS & GYNECOLOGY

## 2023-08-17 PROCEDURE — 59514 CESAREAN DELIVERY ONLY: CPT | Mod: QZ,,, | Performed by: NURSE ANESTHETIST, CERTIFIED REGISTERED

## 2023-08-17 PROCEDURE — 59514 PRA REAN DELIVERY ONLY: ICD-10-PCS | Mod: QZ,,, | Performed by: NURSE ANESTHETIST, CERTIFIED REGISTERED

## 2023-08-17 PROCEDURE — 37000008 HC ANESTHESIA 1ST 15 MINUTES: Performed by: OBSTETRICS & GYNECOLOGY

## 2023-08-17 PROCEDURE — 63600175 PHARM REV CODE 636 W HCPCS: Performed by: NURSE ANESTHETIST, CERTIFIED REGISTERED

## 2023-08-17 PROCEDURE — 36415 COLL VENOUS BLD VENIPUNCTURE: CPT | Performed by: OBSTETRICS & GYNECOLOGY

## 2023-08-17 PROCEDURE — 37000009 HC ANESTHESIA EA ADD 15 MINS: Performed by: OBSTETRICS & GYNECOLOGY

## 2023-08-17 PROCEDURE — 86900 BLOOD TYPING SEROLOGIC ABO: CPT | Performed by: OBSTETRICS & GYNECOLOGY

## 2023-08-17 PROCEDURE — 71000033 HC RECOVERY, INTIAL HOUR: Performed by: OBSTETRICS & GYNECOLOGY

## 2023-08-17 PROCEDURE — 11000001 HC ACUTE MED/SURG PRIVATE ROOM

## 2023-08-17 PROCEDURE — 59514 CESAREAN DELIVERY ONLY: CPT | Mod: GB,,, | Performed by: OBSTETRICS & GYNECOLOGY

## 2023-08-17 PROCEDURE — 85025 COMPLETE CBC W/AUTO DIFF WBC: CPT | Performed by: OBSTETRICS & GYNECOLOGY

## 2023-08-17 PROCEDURE — 36000709 HC OR TIME LEV III EA ADD 15 MIN: Performed by: OBSTETRICS & GYNECOLOGY

## 2023-08-17 PROCEDURE — 25000003 PHARM REV CODE 250: Performed by: OBSTETRICS & GYNECOLOGY

## 2023-08-17 PROCEDURE — 36000708 HC OR TIME LEV III 1ST 15 MIN: Performed by: OBSTETRICS & GYNECOLOGY

## 2023-08-17 PROCEDURE — 63600175 PHARM REV CODE 636 W HCPCS: Performed by: OBSTETRICS & GYNECOLOGY

## 2023-08-17 RX ORDER — METHYLERGONOVINE MALEATE 0.2 MG/ML
200 INJECTION INTRAVENOUS
Status: DISCONTINUED | OUTPATIENT
Start: 2023-08-17 | End: 2023-08-17

## 2023-08-17 RX ORDER — MUPIROCIN 20 MG/G
OINTMENT TOPICAL
Status: DISCONTINUED | OUTPATIENT
Start: 2023-08-17 | End: 2023-08-17

## 2023-08-17 RX ORDER — OXYTOCIN/RINGER'S LACTATE 30/500 ML
334 PLASTIC BAG, INJECTION (ML) INTRAVENOUS ONCE
Status: COMPLETED | OUTPATIENT
Start: 2023-08-17 | End: 2023-08-17

## 2023-08-17 RX ORDER — DOCUSATE SODIUM 100 MG/1
200 CAPSULE, LIQUID FILLED ORAL 2 TIMES DAILY
Status: DISCONTINUED | OUTPATIENT
Start: 2023-08-17 | End: 2023-08-19 | Stop reason: HOSPADM

## 2023-08-17 RX ORDER — HYDROMORPHONE HYDROCHLORIDE 1 MG/ML
1 INJECTION, SOLUTION INTRAMUSCULAR; INTRAVENOUS; SUBCUTANEOUS EVERY 4 HOURS PRN
Status: DISCONTINUED | OUTPATIENT
Start: 2023-08-17 | End: 2023-08-19 | Stop reason: HOSPADM

## 2023-08-17 RX ORDER — BUPIVACAINE HYDROCHLORIDE 7.5 MG/ML
INJECTION, SOLUTION EPIDURAL; RETROBULBAR
Status: COMPLETED | OUTPATIENT
Start: 2023-08-17 | End: 2023-08-17

## 2023-08-17 RX ORDER — DIPHENHYDRAMINE HCL 25 MG
25 CAPSULE ORAL EVERY 4 HOURS PRN
Status: DISCONTINUED | OUTPATIENT
Start: 2023-08-17 | End: 2023-08-19 | Stop reason: HOSPADM

## 2023-08-17 RX ORDER — SODIUM CHLORIDE, SODIUM LACTATE, POTASSIUM CHLORIDE, CALCIUM CHLORIDE 600; 310; 30; 20 MG/100ML; MG/100ML; MG/100ML; MG/100ML
INJECTION, SOLUTION INTRAVENOUS CONTINUOUS
Status: DISCONTINUED | OUTPATIENT
Start: 2023-08-17 | End: 2023-08-17

## 2023-08-17 RX ORDER — MISOPROSTOL 100 UG/1
1000 TABLET ORAL
Status: DISCONTINUED | OUTPATIENT
Start: 2023-08-17 | End: 2023-08-17

## 2023-08-17 RX ORDER — AMOXICILLIN 250 MG
1 CAPSULE ORAL NIGHTLY PRN
Status: DISCONTINUED | OUTPATIENT
Start: 2023-08-17 | End: 2023-08-19 | Stop reason: HOSPADM

## 2023-08-17 RX ORDER — ONDANSETRON 4 MG/1
8 TABLET, ORALLY DISINTEGRATING ORAL EVERY 8 HOURS PRN
Status: DISCONTINUED | OUTPATIENT
Start: 2023-08-17 | End: 2023-08-19 | Stop reason: HOSPADM

## 2023-08-17 RX ORDER — PRENATAL WITH FERROUS FUM AND FOLIC ACID 3080; 920; 120; 400; 22; 1.84; 3; 20; 10; 1; 12; 200; 27; 25; 2 [IU]/1; [IU]/1; MG/1; [IU]/1; MG/1; MG/1; MG/1; MG/1; MG/1; MG/1; UG/1; MG/1; MG/1; MG/1; MG/1
1 TABLET ORAL DAILY
Status: DISCONTINUED | OUTPATIENT
Start: 2023-08-17 | End: 2023-08-19 | Stop reason: HOSPADM

## 2023-08-17 RX ORDER — OXYTOCIN/RINGER'S LACTATE 30/500 ML
95 PLASTIC BAG, INJECTION (ML) INTRAVENOUS ONCE
Status: DISCONTINUED | OUTPATIENT
Start: 2023-08-17 | End: 2023-08-19 | Stop reason: HOSPADM

## 2023-08-17 RX ORDER — IBUPROFEN 600 MG/1
600 TABLET ORAL EVERY 6 HOURS
Status: DISCONTINUED | OUTPATIENT
Start: 2023-08-17 | End: 2023-08-19 | Stop reason: HOSPADM

## 2023-08-17 RX ORDER — TRIPROLIDINE/PSEUDOEPHEDRINE 2.5MG-60MG
600 TABLET ORAL EVERY 6 HOURS PRN
Status: DISCONTINUED | OUTPATIENT
Start: 2023-08-17 | End: 2023-08-17

## 2023-08-17 RX ORDER — IBUPROFEN 600 MG/1
600 TABLET ORAL EVERY 6 HOURS PRN
Status: DISCONTINUED | OUTPATIENT
Start: 2023-08-17 | End: 2023-08-17

## 2023-08-17 RX ORDER — MORPHINE SULFATE 4 MG/ML
4 INJECTION, SOLUTION INTRAMUSCULAR; INTRAVENOUS
Status: DISCONTINUED | OUTPATIENT
Start: 2023-08-17 | End: 2023-08-19 | Stop reason: HOSPADM

## 2023-08-17 RX ORDER — CEFAZOLIN SODIUM 1 G/3ML
INJECTION, POWDER, FOR SOLUTION INTRAMUSCULAR; INTRAVENOUS
Status: DISCONTINUED | OUTPATIENT
Start: 2023-08-17 | End: 2023-08-17 | Stop reason: HOSPADM

## 2023-08-17 RX ORDER — ADHESIVE BANDAGE
30 BANDAGE TOPICAL 2 TIMES DAILY PRN
Status: DISCONTINUED | OUTPATIENT
Start: 2023-08-18 | End: 2023-08-19 | Stop reason: HOSPADM

## 2023-08-17 RX ORDER — PROCHLORPERAZINE EDISYLATE 5 MG/ML
5 INJECTION INTRAMUSCULAR; INTRAVENOUS EVERY 6 HOURS PRN
Status: DISCONTINUED | OUTPATIENT
Start: 2023-08-17 | End: 2023-08-19 | Stop reason: HOSPADM

## 2023-08-17 RX ORDER — CEFAZOLIN SODIUM 2 G/50ML
2 SOLUTION INTRAVENOUS ONCE AS NEEDED
Status: DISCONTINUED | OUTPATIENT
Start: 2023-08-17 | End: 2023-08-17

## 2023-08-17 RX ORDER — OXYTOCIN/RINGER'S LACTATE 30/500 ML
334 PLASTIC BAG, INJECTION (ML) INTRAVENOUS ONCE AS NEEDED
Status: DISCONTINUED | OUTPATIENT
Start: 2023-08-17 | End: 2023-08-19 | Stop reason: HOSPADM

## 2023-08-17 RX ORDER — OXYTOCIN/RINGER'S LACTATE 30/500 ML
95 PLASTIC BAG, INJECTION (ML) INTRAVENOUS ONCE
Status: DISCONTINUED | OUTPATIENT
Start: 2023-08-17 | End: 2023-08-17

## 2023-08-17 RX ORDER — SODIUM CITRATE AND CITRIC ACID MONOHYDRATE 334; 500 MG/5ML; MG/5ML
30 SOLUTION ORAL
Status: DISCONTINUED | OUTPATIENT
Start: 2023-08-17 | End: 2023-08-17

## 2023-08-17 RX ORDER — BISACODYL 10 MG
10 SUPPOSITORY, RECTAL RECTAL ONCE AS NEEDED
Status: DISCONTINUED | OUTPATIENT
Start: 2023-08-17 | End: 2023-08-19 | Stop reason: HOSPADM

## 2023-08-17 RX ORDER — HYDROCODONE BITARTRATE AND ACETAMINOPHEN 7.5; 325 MG/1; MG/1
1 TABLET ORAL EVERY 4 HOURS PRN
Status: DISCONTINUED | OUTPATIENT
Start: 2023-08-17 | End: 2023-08-19 | Stop reason: HOSPADM

## 2023-08-17 RX ORDER — FENTANYL CITRATE 50 UG/ML
INJECTION, SOLUTION INTRAMUSCULAR; INTRAVENOUS
Status: DISCONTINUED | OUTPATIENT
Start: 2023-08-17 | End: 2023-08-17

## 2023-08-17 RX ORDER — METHYLERGONOVINE MALEATE 0.2 MG/ML
200 INJECTION INTRAVENOUS
Status: DISCONTINUED | OUTPATIENT
Start: 2023-08-17 | End: 2023-08-19 | Stop reason: HOSPADM

## 2023-08-17 RX ORDER — CARBOPROST TROMETHAMINE 250 UG/ML
250 INJECTION, SOLUTION INTRAMUSCULAR
Status: DISCONTINUED | OUTPATIENT
Start: 2023-08-17 | End: 2023-08-17

## 2023-08-17 RX ORDER — TRANEXAMIC ACID 10 MG/ML
1000 INJECTION, SOLUTION INTRAVENOUS ONCE AS NEEDED
Status: DISCONTINUED | OUTPATIENT
Start: 2023-08-17 | End: 2023-08-19 | Stop reason: HOSPADM

## 2023-08-17 RX ORDER — SODIUM CHLORIDE 0.9 % (FLUSH) 0.9 %
10 SYRINGE (ML) INJECTION
Status: DISCONTINUED | OUTPATIENT
Start: 2023-08-17 | End: 2023-08-19 | Stop reason: HOSPADM

## 2023-08-17 RX ORDER — BUPIVACAINE HYDROCHLORIDE 5 MG/ML
INJECTION, SOLUTION EPIDURAL; INTRACAUDAL
Status: DISCONTINUED | OUTPATIENT
Start: 2023-08-17 | End: 2023-08-17 | Stop reason: HOSPADM

## 2023-08-17 RX ORDER — MIDAZOLAM HYDROCHLORIDE 1 MG/ML
INJECTION INTRAMUSCULAR; INTRAVENOUS
Status: DISCONTINUED | OUTPATIENT
Start: 2023-08-17 | End: 2023-08-17

## 2023-08-17 RX ORDER — OXYTOCIN 10 [USP'U]/ML
10 INJECTION, SOLUTION INTRAMUSCULAR; INTRAVENOUS ONCE AS NEEDED
Status: DISCONTINUED | OUTPATIENT
Start: 2023-08-17 | End: 2023-08-19 | Stop reason: HOSPADM

## 2023-08-17 RX ORDER — CARBOPROST TROMETHAMINE 250 UG/ML
250 INJECTION, SOLUTION INTRAMUSCULAR
Status: DISCONTINUED | OUTPATIENT
Start: 2023-08-17 | End: 2023-08-19 | Stop reason: HOSPADM

## 2023-08-17 RX ORDER — MUPIROCIN 20 MG/G
OINTMENT TOPICAL 2 TIMES DAILY
Status: DISCONTINUED | OUTPATIENT
Start: 2023-08-17 | End: 2023-08-19 | Stop reason: HOSPADM

## 2023-08-17 RX ORDER — ACETAMINOPHEN 10 MG/ML
1000 INJECTION, SOLUTION INTRAVENOUS ONCE
Status: COMPLETED | OUTPATIENT
Start: 2023-08-17 | End: 2023-08-17

## 2023-08-17 RX ORDER — SIMETHICONE 80 MG
1 TABLET,CHEWABLE ORAL EVERY 6 HOURS PRN
Status: DISCONTINUED | OUTPATIENT
Start: 2023-08-17 | End: 2023-08-19 | Stop reason: HOSPADM

## 2023-08-17 RX ORDER — OXYTOCIN/RINGER'S LACTATE 30/500 ML
95 PLASTIC BAG, INJECTION (ML) INTRAVENOUS ONCE AS NEEDED
Status: DISCONTINUED | OUTPATIENT
Start: 2023-08-17 | End: 2023-08-19 | Stop reason: HOSPADM

## 2023-08-17 RX ORDER — MISOPROSTOL 100 UG/1
800 TABLET ORAL ONCE AS NEEDED
Status: DISCONTINUED | OUTPATIENT
Start: 2023-08-17 | End: 2023-08-19 | Stop reason: HOSPADM

## 2023-08-17 RX ORDER — FAMOTIDINE 10 MG/ML
20 INJECTION INTRAVENOUS
Status: DISCONTINUED | OUTPATIENT
Start: 2023-08-17 | End: 2023-08-17

## 2023-08-17 RX ORDER — SODIUM CHLORIDE 0.9 G/100ML
IRRIGANT IRRIGATION
Status: DISCONTINUED | OUTPATIENT
Start: 2023-08-17 | End: 2023-08-17 | Stop reason: HOSPADM

## 2023-08-17 RX ORDER — ONDANSETRON 2 MG/ML
INJECTION INTRAMUSCULAR; INTRAVENOUS
Status: DISCONTINUED | OUTPATIENT
Start: 2023-08-17 | End: 2023-08-17

## 2023-08-17 RX ORDER — MISOPROSTOL 100 UG/1
1000 TABLET ORAL ONCE AS NEEDED
Status: DISCONTINUED | OUTPATIENT
Start: 2023-08-17 | End: 2023-08-19 | Stop reason: HOSPADM

## 2023-08-17 RX ADMIN — SODIUM CHLORIDE, POTASSIUM CHLORIDE, SODIUM LACTATE AND CALCIUM CHLORIDE 1000 ML: 600; 310; 30; 20 INJECTION, SOLUTION INTRAVENOUS at 11:08

## 2023-08-17 RX ADMIN — HYDROCODONE BITARTRATE AND ACETAMINOPHEN 1 TABLET: 7.5; 325 TABLET ORAL at 11:08

## 2023-08-17 RX ADMIN — HYDROCODONE BITARTRATE AND ACETAMINOPHEN 1 TABLET: 7.5; 325 TABLET ORAL at 02:08

## 2023-08-17 RX ADMIN — ONDANSETRON 4 MG: 2 INJECTION INTRAMUSCULAR; INTRAVENOUS at 12:08

## 2023-08-17 RX ADMIN — IBUPROFEN 600 MG: 600 TABLET ORAL at 05:08

## 2023-08-17 RX ADMIN — CEFAZOLIN 2 G: 1 INJECTION, POWDER, FOR SOLUTION INTRAMUSCULAR; INTRAVENOUS at 12:08

## 2023-08-17 RX ADMIN — IBUPROFEN 600 MG: 600 TABLET ORAL at 11:08

## 2023-08-17 RX ADMIN — SODIUM CHLORIDE, POTASSIUM CHLORIDE, SODIUM LACTATE AND CALCIUM CHLORIDE: 600; 310; 30; 20 INJECTION, SOLUTION INTRAVENOUS at 12:08

## 2023-08-17 RX ADMIN — ACETAMINOPHEN 1000 MG: 10 INJECTION INTRAVENOUS at 02:08

## 2023-08-17 RX ADMIN — BUPIVACAINE HYDROCHLORIDE 1.9 ML: 7.5 INJECTION, SOLUTION EPIDURAL; RETROBULBAR at 12:08

## 2023-08-17 RX ADMIN — MIDAZOLAM HYDROCHLORIDE 2 MG: 1 INJECTION, SOLUTION INTRAMUSCULAR; INTRAVENOUS at 01:08

## 2023-08-17 RX ADMIN — Medication 999 ML/HR: at 01:08

## 2023-08-17 RX ADMIN — FAMOTIDINE 20 MG: 10 INJECTION, SOLUTION INTRAVENOUS at 10:08

## 2023-08-17 RX ADMIN — FENTANYL CITRATE 100 MCG: 50 INJECTION, SOLUTION INTRAMUSCULAR; INTRAVENOUS at 01:08

## 2023-08-17 NOTE — PLAN OF CARE
Patient is resting comfortably with no reports of op site pain. Patient meets criteria for transfer.

## 2023-08-17 NOTE — ANESTHESIA POSTPROCEDURE EVALUATION
Anesthesia Post Evaluation    Patient: Freida Tim    Procedure(s) Performed: Procedure(s) (LRB):   SECTION (N/A)    Final Anesthesia Type: spinal      Patient location during evaluation: PACU  Patient participation: Yes- Able to Participate  Level of consciousness: awake and alert, awake and oriented  Post-procedure vital signs: reviewed and stable  Pain management: adequate  Airway patency: patent  NI mitigation strategies: Preoperative initiation of continuous positive airway pressure (CPAP) or non-invasive positive pressure ventilation (NIPPV), Multimodal analgesia and Use of major conduction anesthesia (spinal/epidural) or peripheral nerve block  PONV status at discharge: No PONV  Anesthetic complications: no      Cardiovascular status: blood pressure returned to baseline  Respiratory status: unassisted, room air and spontaneous ventilation  Hydration status: euvolemic  Follow-up not needed.          Vitals Value Taken Time   /50 23 1341   Temp 36.6 °C (97.8 °F) 23 1021   Pulse 93 23 1341   Resp 18 23 1021   SpO2 98 % 23 1341   Vitals shown include unvalidated device data.      No case tracking events are documented in the log.      Pain/Jolie Score: No data recorded

## 2023-08-17 NOTE — ANESTHESIA PROCEDURE NOTES
Spinal    Diagnosis: repeat C/S  Patient location during procedure: OR  Start time: 8/17/2023 12:34 PM  Timeout: 8/17/2023 12:29 PM  End time: 8/17/2023 12:41 PM    Staffing  Authorizing Provider: Mauri Castro CRNA  Performing Provider: Mauri Castro CRNA    Staffing  Performed by: Mauri Castro CRNA  Authorized by: Mauri Castro CRNA    Preanesthetic Checklist  Completed: patient identified, IV checked, site marked, risks and benefits discussed, surgical consent, monitors and equipment checked, pre-op evaluation and timeout performed  Spinal Block  Patient position: sitting  Prep: ChloraPrep  Patient monitoring: cardiac monitor, continuous pulse ox, continuous capnometry and frequent blood pressure checks  Approach: midline  Location: L3-4  Injection technique: single shot  CSF Fluid: clear free-flowing CSF  Needle  Needle type: pencil-tip   Needle gauge: 22 G  Additional Documentation: incremental injection  Needle localization: anatomical landmarks  Assessment  Sensory level: T4   Dermatomal levels determined by alcohol wipe  Ease of block: moderate  Medications:    Medications: bupivacaine (pf) (MARCAINE) injection 0.75% - Intraspinal   1.9 mL - 8/17/2023 12:41:00 PM

## 2023-08-17 NOTE — INTERVAL H&P NOTE
The patient has been examined and the H&P has been reviewed:    I concur with the findings and no changes have occurred since H&P was written.    Anesthesia/Surgery risks, benefits and alternative options discussed and understood by patient/family.        IUP @ 39 w with prev CS desires repeat  Active Hospital Problems    Diagnosis  POA    *Uterine scar from previous  delivery, antepartum [O34.219]  Yes     Repeat CS 39wks, 23      Benign gestational thrombocytopenia in third trimester [O99.113, D69.6]  Yes      Resolved Hospital Problems   No resolved problems to display.

## 2023-08-17 NOTE — L&D DELIVERY NOTE
[facility]-Mother/Baby   Section   Operative Note    SUMMARY     Date of Procedure: 2023     Procedure: Procedure(s) (LRB):   SECTION (N/A)    Surgeon(s) and Role:     * Rosa Isela Ro MD - Primary    Assisting Surgeon: None    Pre-Operative Diagnosis: IUP @ 39w1d, Uterine scar from previous  delivery, antepartum [O34.219]    Post-Operative Diagnosis: Post-Op Diagnosis Codes:     * Uterine scar from previous  delivery, antepartum [O34.219]    Anesthesia: Spinal    Technical Procedures Used: Lower Transverse hysterotomy via Pfannenstiel incision           Description of the Findings of the Procedure: normal appearing uterus, bilateral ovaries and fallopian tubes. See infant information bellow.    Complications: No    Blood Loss: 600mL    Condition: Good    Disposition: PACU - hemodynamically stable.    Attestation: Good    PROCEDURE IN DETAIL:   After verbal consent was obtained the patient was brought to the operating room she was then prepped and draped in normal sterile fashion in the dorsal supine position. Time out taken with OR team members.  Anesthesia was found to be adequate. Pfannenstiel incision made through the skin, transverse fascial incision developed, rectus muscles  in the midline and the peritoneum entered.  No adhesions noted.  The lower uterine segment and position of the fetus identified. Bladder flap created with Metzenbaum scissors.  Low Transverse Incision made through  lower uterine segment and extended laterally with blunt dissection. Clear fluid noted.  Infant delivered from vertex presentation.  Cord clamped and cut the  was handed to attending nurse. Cord blood taken, placenta delivered. The uterus was exteriorized and cleared of all clot and debris.  The hysterotomy was repaired with a 1-0 Monocryl in a running locked fashion.  A 2nd layer of the same suture was used to obtain excellent hemostasis via imbrication.   "Irrigation was performed. The uterus was returned to the abdomen.  The hysterotomy site was noted to be hemostatic.  Rectus muscles and peritoneum were reapproximated with a 1-0 chromic in interrupted fashion.  Fascia was reapproximated with a 1-0 looped PDS in a running fashion.  Iraida's was reapproximated with a 3-0 plain gut in a running fashion.  Skin was closed with  4-0 Vicryl on a Eric needle.  Local Marcaine was applied.  Dermabond was applied.  Patient tolerated the procedure well sponge lap needle counts were correct x3.              Specimens:   Specimen (24h ago, onward)      None            Condition: Good    VTE Risk Mitigation (From admission, onward)           Ordered     IP VTE LOW RISK PATIENT  Once         23 1004     Place sequential compression device  Until discontinued         23 1004                    Disposition: PACU - hemodynamically stable.    Attestation: Good         Delivery Information for Bebeto Tim    Birth information:  YOB: 2021   Time of birth: 1:10 PM   Sex: male   Head Delivery Date/Time: 2023  1:10 PM   Delivery type:    Gestational Age: 39w1d        Delivery Providers    Delivering clinician: Rosa Isela Ro MD   Provider Role    Rohini Nguyen RN Registered Nurse    Lejeune, Ashley, RN Registered Nurse    Gisela Perry RN Registered Nurse              Measurements    Weight: 2960 g  Weight (lbs): 6 lb 8.4 oz  Length: 48.3 cm  Length (in): 19"  Head circumference: 33.7 cm         Apgars    Living status: Living  Apgar Component Scores:  1 min.:  5 min.:  10 min.:  15 min.:  20 min.:    Skin color:  0  1  2      Heart rate:  2  1  2      Reflex irritability:  2  1  2      Muscle tone:  2  2  2      Respiratory effort:  2  1  2      Total:  8  6  10      Apgars assigned by: JENNIFER CONNOLLY RN         Operative Delivery    Forceps attempted?: No  Vacuum extractor attempted?: No         Shoulder Dystocia    Shoulder dystocia present?: No   "         Presentation    Presentation: Vertex  Position: Left Occiput Anterior           Interventions/Resuscitation    Method: Bulb Suctioning, Tactile Stimulation, Deep Suctioning, CPAP, Blow By Oxygen       Cord    Vessels: 3 vessels  Complications: None  Delayed Cord Clamping?: No  Cord Clamped Date/Time: 2023  1:11 PM  Cord Blood Disposition: Lab  Gases Sent?: Yes  Stem Cell Collection (by MD): No       Placenta    Placenta delivery date/time: 2023 1310  Placenta removal: Manual removal  Placenta appearance: Intact  Placenta disposition: Donation           Labor Events:       labor:       Labor Onset Date/Time:         Dilation Complete Date/Time:         Start Pushing Date/Time:       Rupture Date/Time:            Rupture type:          Fluid Amount:       Fluid Color:        steroids:       Antibiotics given for GBS:       Induction:       Indications for induction:        Augmentation:       Indications for augmentation:       Labor complications:       Additional complications:          Cervical ripening:                     Delivery:      Episiotomy:       Indication for Episiotomy:       Perineal Lacerations:   Repaired:      Periurethral Laceration:   Repaired:     Labial Laceration:   Repaired:     Sulcus Laceration:   Repaired:     Vaginal Laceration:   Repaired:     Cervical Laceration:   Repaired:     Repair suture:       Repair # of packets:       Last Value - EBL - Nursing (mL):       Sum - EBL - Nursing (mL): 0     Last Value - EBL - Anesthesia (mL):      Calculated QBL (mL):       Vaginal Sweep Performed:       Surgicount Correct:         Other providers:       Anesthesia    Method: Spinal          Details (if applicable):  Trial of Labor No    Categorization: Repeat    Priority: Routine   Indications for : Repeat Section   Incision Type: low transverse     Additional  information:  Forceps:    Vacuum:    Breech:    Observed anomalies     Other (Comments):

## 2023-08-18 LAB
BASOPHILS # BLD AUTO: 0.03 X10(3)/MCL (ref 0.01–0.08)
BASOPHILS NFR BLD AUTO: 0.4 % (ref 0.1–1.2)
EOSINOPHIL # BLD AUTO: 0.06 X10(3)/MCL (ref 0.04–0.36)
EOSINOPHIL NFR BLD AUTO: 0.8 % (ref 0.7–7)
ERYTHROCYTE [DISTWIDTH] IN BLOOD BY AUTOMATED COUNT: 12.6 % (ref 11–14.5)
HCT VFR BLD AUTO: 33.5 % (ref 36–48)
HGB BLD-MCNC: 11.5 G/DL (ref 11.8–16)
IMM GRANULOCYTES # BLD AUTO: 0.05 X10(3)/MCL (ref 0–0.03)
IMM GRANULOCYTES NFR BLD AUTO: 0.6 % (ref 0–0.5)
LYMPHOCYTES # BLD AUTO: 1.79 X10(3)/MCL (ref 1.16–3.74)
LYMPHOCYTES NFR BLD AUTO: 22.9 % (ref 20–55)
MCH RBC QN AUTO: 31.3 PG (ref 27–34)
MCHC RBC AUTO-ENTMCNC: 34.3 G/DL (ref 31–37)
MCV RBC AUTO: 91 FL (ref 79–99)
MONOCYTES # BLD AUTO: 0.52 X10(3)/MCL (ref 0.24–0.36)
MONOCYTES NFR BLD AUTO: 6.6 % (ref 4.7–12.5)
NEUTROPHILS # BLD AUTO: 5.38 X10(3)/MCL (ref 1.56–6.13)
NEUTROPHILS NFR BLD AUTO: 68.7 % (ref 37–73)
NRBC BLD AUTO-RTO: 0 %
PLATELET # BLD AUTO: 132 X10(3)/MCL (ref 140–371)
PLATELET # BLD EST: ADEQUATE 10*3/UL
PMV BLD AUTO: 11.7 FL (ref 9.4–12.4)
RBC # BLD AUTO: 3.68 X10(6)/MCL (ref 4–5.1)
WBC # SPEC AUTO: 7.83 X10(3)/MCL (ref 4–11.5)

## 2023-08-18 PROCEDURE — 25000242 PHARM REV CODE 250 ALT 637 W/ HCPCS: Performed by: OBSTETRICS & GYNECOLOGY

## 2023-08-18 PROCEDURE — 99231 PR SUBSEQUENT HOSPITAL CARE,LEVL I: ICD-10-PCS | Mod: ,,, | Performed by: OBSTETRICS & GYNECOLOGY

## 2023-08-18 PROCEDURE — 11000001 HC ACUTE MED/SURG PRIVATE ROOM

## 2023-08-18 PROCEDURE — 85025 COMPLETE CBC W/AUTO DIFF WBC: CPT | Performed by: OBSTETRICS & GYNECOLOGY

## 2023-08-18 PROCEDURE — 25000003 PHARM REV CODE 250: Performed by: OBSTETRICS & GYNECOLOGY

## 2023-08-18 PROCEDURE — 99231 SBSQ HOSP IP/OBS SF/LOW 25: CPT | Mod: ,,, | Performed by: OBSTETRICS & GYNECOLOGY

## 2023-08-18 PROCEDURE — 36415 COLL VENOUS BLD VENIPUNCTURE: CPT | Performed by: OBSTETRICS & GYNECOLOGY

## 2023-08-18 RX ADMIN — Medication: at 10:08

## 2023-08-18 RX ADMIN — DOCUSATE SODIUM 200 MG: 100 CAPSULE, LIQUID FILLED ORAL at 08:08

## 2023-08-18 RX ADMIN — IBUPROFEN 600 MG: 600 TABLET ORAL at 06:08

## 2023-08-18 RX ADMIN — HYDROCODONE BITARTRATE AND ACETAMINOPHEN 1 TABLET: 7.5; 325 TABLET ORAL at 01:08

## 2023-08-18 RX ADMIN — IBUPROFEN 600 MG: 600 TABLET ORAL at 05:08

## 2023-08-18 RX ADMIN — DOCUSATE SODIUM 200 MG: 100 CAPSULE, LIQUID FILLED ORAL at 09:08

## 2023-08-18 RX ADMIN — IBUPROFEN 600 MG: 600 TABLET ORAL at 11:08

## 2023-08-18 RX ADMIN — PRENATAL VITAMINS-IRON FUMARATE 27 MG IRON-FOLIC ACID 0.8 MG TABLET 1 TABLET: at 08:08

## 2023-08-18 RX ADMIN — HYDROCODONE BITARTRATE AND ACETAMINOPHEN 1 TABLET: 7.5; 325 TABLET ORAL at 08:08

## 2023-08-18 RX ADMIN — HYDROCODONE BITARTRATE AND ACETAMINOPHEN 1 TABLET: 7.5; 325 TABLET ORAL at 11:08

## 2023-08-18 RX ADMIN — IBUPROFEN 600 MG: 600 TABLET ORAL at 01:08

## 2023-08-18 NOTE — PROGRESS NOTES
Delivery Progress Note  Obstetrics        SUBJECTIVE:     Postpartum Day 1: Repeat  Delivery    The patient is doing well this morning.  She offers no new complaints. She denies excessive vaginal bleeding, unequal lower extremity swelling, chest pain, and shortness of breath, She denies PIH symptoms. She is ambulating and voiding without difficulties.  She is passing flatus. She has been afebrile.  The infant is doing well.     OBJECTIVE:     Vital Signs (Most Recent):  Temp: 97.8 °F (36.6 °C) (23 1308)  Pulse: 78 (23 1200)  Resp: 18 (23 1316)  BP: 113/81 (23 1200)  SpO2: 100 % (23 0400)    Vital Signs Range (Last 24H):  Temp:  [97.4 °F (36.3 °C)-98.5 °F (36.9 °C)]   Pulse:  [59-89]   Resp:  [18-20]   BP: (106-119)/(56-81)   SpO2:  [99 %-100 %]     I & O (Last 24H):  Intake/Output Summary (Last 24 hours) at 2023 1633  Last data filed at 2023 2300  Gross per 24 hour   Intake --   Output 1300 ml   Net -1300 ml     Physical Exam:  General:    no distress   Lungs:  clear to auscultation bilaterally   Heart:  regular rate and rhythm, S1, S2 normal, no murmur, click, rub or gallop   Abdomen:  soft, non-tender; bowel sounds normal   Fundus:  firm   Incision:  healing well, staples intact   Lochia:   scant rubra   DVT Evaluation:  No evidence of DVT on either side seen on physical exam.     Hemoglobin/Hematocrit  Recent Labs   Lab 23  0520   HGB 11.5*   HCT 33.5*     ABO/Rh  Lab Results   Component Value Date    GROUPTRH O POS 02/15/2023       ASSESSMENT/PLAN:     Status post  section. Doing well postoperatively.     Continue current care.

## 2023-08-19 VITALS
HEIGHT: 63 IN | HEART RATE: 87 BPM | SYSTOLIC BLOOD PRESSURE: 115 MMHG | WEIGHT: 175.25 LBS | RESPIRATION RATE: 18 BRPM | TEMPERATURE: 98 F | DIASTOLIC BLOOD PRESSURE: 74 MMHG | OXYGEN SATURATION: 99 % | BODY MASS INDEX: 31.05 KG/M2

## 2023-08-19 PROCEDURE — 99238 PR HOSPITAL DISCHARGE DAY,<30 MIN: ICD-10-PCS | Mod: ,,, | Performed by: OBSTETRICS & GYNECOLOGY

## 2023-08-19 PROCEDURE — 59025 FETAL NON-STRESS TEST: CPT

## 2023-08-19 PROCEDURE — 99238 HOSP IP/OBS DSCHRG MGMT 30/<: CPT | Mod: ,,, | Performed by: OBSTETRICS & GYNECOLOGY

## 2023-08-19 PROCEDURE — 25000003 PHARM REV CODE 250: Performed by: OBSTETRICS & GYNECOLOGY

## 2023-08-19 PROCEDURE — 96360 HYDRATION IV INFUSION INIT: CPT

## 2023-08-19 RX ORDER — IBUPROFEN 600 MG/1
600 TABLET ORAL EVERY 6 HOURS PRN
Qty: 30 TABLET | Refills: 0 | Status: SHIPPED | OUTPATIENT
Start: 2023-08-19 | End: 2023-09-18

## 2023-08-19 RX ORDER — HYDROCODONE BITARTRATE AND ACETAMINOPHEN 7.5; 325 MG/1; MG/1
1 TABLET ORAL EVERY 6 HOURS PRN
Qty: 12 TABLET | Refills: 0 | Status: SHIPPED | OUTPATIENT
Start: 2023-08-19 | End: 2023-11-15

## 2023-08-19 RX ADMIN — PRENATAL VITAMINS-IRON FUMARATE 27 MG IRON-FOLIC ACID 0.8 MG TABLET 1 TABLET: at 01:08

## 2023-08-19 RX ADMIN — IBUPROFEN 600 MG: 600 TABLET ORAL at 05:08

## 2023-08-19 RX ADMIN — IBUPROFEN 600 MG: 600 TABLET ORAL at 01:08

## 2023-08-19 RX ADMIN — DOCUSATE SODIUM 200 MG: 100 CAPSULE, LIQUID FILLED ORAL at 01:08

## 2023-08-19 NOTE — DISCHARGE SUMMARY
[facility]  Discharge Summary      Patient Name: Freida Tim  MRN: [id number]  Admission Date: 2023  Hospital Length of Stay: 2 days  Discharge Date and Time:  2023 11:55 AM  Attending Physician: Rosa Isela Ro MD   Discharging Provider: REBEKAH FRANCISCO MD  Primary Care Provider: Bozena, Primary Doctor    HPI: Post-op Day #2, S/P  section    Procedure(s) (LRB):   SECTION (N/A)     Hospital Course: The patient is doing well this morning.  She offers no new complaints. She denies excessive vaginal bleeding, unequal lower extremity swelling, chest pain, and shortness of breath, She denies PIH symptoms. She is ambulating and voiding without difficulties.  She is passing flatus. She has been afebrile.  The infant is doing well. She feels she is ready to be discharged home.    Final Active Diagnoses:    Diagnosis Date Noted POA    PRINCIPAL PROBLEM:  Uterine scar from previous  delivery, antepartum [O34.219] 2023 Yes    Benign gestational thrombocytopenia in third trimester [O99.113, D69.6] 2023 Yes      Problems Resolved During this Admission:      Vitals:    23 2338 23 0000 23 0400 23 1000   BP:  124/78 (!) 111/53 115/74   BP Location:  Right arm Right arm    Patient Position:  Lying Lying    Pulse:  85 89 87   Resp: 18  16 18   Temp:  97.8 °F (36.6 °C) 97.5 °F (36.4 °C) 97.6 °F (36.4 °C)   TempSrc:  Temporal Temporal Temporal   SpO2:  98% 98%    Weight:       Height:       Physical Exam  Constitutional:       Appearance: Normal appearance.   Genitourinary:      Genitourinary Comments: FUNDUS IS FIRM AND BELOW THE UMBILICUS.   Cardiovascular:      Rate and Rhythm: Normal rate and regular rhythm.   Pulmonary:      Effort: Pulmonary effort is normal.   Abdominal:      Palpations: Abdomen is soft.      Comments: STAPLES INTACT, INCISION IS CLEAN, DRY, AND INTACT.   Musculoskeletal:         General: Normal range of  "motion.   Neurological:      Mental Status: She is alert and oriented to person, place, and time.   Skin:     General: Skin is warm and dry.   Psychiatric:         Mood and Affect: Mood normal.   Vitals and nursing note reviewed. Exam conducted with a chaperone present.       Recent Labs   Lab 23  0520   WBC 7.83   HGB 11.5*   HCT 33.5*   *       Feeding Method: both breast and bottle    Birth information:  YOB: 2021   Time of birth: 1:10 PM   Sex: male   Delivery type: , Low Transverse   Gestational Age: 39w1d     Measurements    Weight: 2960 g  Weight (lbs): 6 lb 8.4 oz  Length: 48.3 cm  Length (in): 19"  Head circumference: 33.7 cm         Delivery Clinician: Delivery Providers    Delivering clinician: Rosa Isela Ro MD   Provider Role    Rohini Nguyen RN Registered Nurse    Lejeune, Ashley, RN Registered Nurse    Gisela Perry RN Registered Nurse               Apgars    Living status: Living  Apgar Component Scores:  1 min.:  5 min.:  10 min.:  15 min.:  20 min.:    Skin color:  0  1  2      Heart rate:  2  1  2      Reflex irritability:  2  1  2      Muscle tone:  2  2  2      Respiratory effort:  2  1  2      Total:  8  6  10      Apgars assigned by: JENNIFER CONNOLLY RN       Discharged Condition: good    Disposition: Home or Self Care    Follow Up:   Follow-up Information       Rosa Isela Ro MD Follow up.    Specialty: Obstetrics and Gynecology  Why: The patient will followup in one week for staple removal on  @ 8 AM.    The patient will also followup on  @ 11:10 AM.  Contact information:  [address] [place] [id number] [phone]                           Patient Instructions:      Diet Adult Regular     Pelvic Rest     Notify your health care provider if you experience any of the following:  temperature >100.4     Notify your health care provider if you experience any of the following:  persistent nausea and vomiting or " diarrhea     Notify your health care provider if you experience any of the following:  severe uncontrolled pain     Notify your health care provider if you experience any of the following:  redness, tenderness, or signs of infection (pain, swelling, redness, odor or green/yellow discharge around incision site)     Notify your health care provider if you experience any of the following:  difficulty breathing or increased cough     Notify your health care provider if you experience any of the following:  severe persistent headache     Notify your health care provider if you experience any of the following:  persistent dizziness, light-headedness, or visual disturbances     Notify your health care provider if you experience any of the following:  increased confusion or weakness     No dressing needed     Activity as tolerated     Medications:  Current Discharge Medication List        START taking these medications    Details   HYDROcodone-acetaminophen (NORCO) 7.5-325 mg per tablet Take 1 tablet by mouth every 6 (six) hours as needed for Pain.  Qty: 12 tablet, Refills: 0    Comments: Quantity prescribed more than 7 day supply? No      ibuprofen (ADVIL,MOTRIN) 600 MG tablet Take 1 tablet (600 mg total) by mouth every 6 (six) hours as needed for Pain.  Qty: 30 tablet, Refills: 0           CONTINUE these medications which have NOT CHANGED    Details   PRENATAL VITAMIN 27 mg iron- 0.8 mg Tab Take 1 tablet by mouth.             REBEKAH FRANCISCO MD  Obstetrics  [facility]

## 2023-08-20 ENCOUNTER — HOSPITAL ENCOUNTER (EMERGENCY)
Facility: HOSPITAL | Age: 23
Discharge: HOME | End: 2023-08-20
Attending: FAMILY MEDICINE
Payer: MEDICAID

## 2023-08-20 VITALS
HEIGHT: 65 IN | TEMPERATURE: 98 F | RESPIRATION RATE: 18 BRPM | DIASTOLIC BLOOD PRESSURE: 67 MMHG | BODY MASS INDEX: 28.82 KG/M2 | WEIGHT: 173 LBS | SYSTOLIC BLOOD PRESSURE: 124 MMHG | OXYGEN SATURATION: 99 % | HEART RATE: 78 BPM

## 2023-08-20 DIAGNOSIS — Z78.9 PRESENCE OF SURGICAL INCISION: Primary | ICD-10-CM

## 2023-08-20 PROCEDURE — 99282 EMERGENCY DEPT VISIT SF MDM: CPT

## 2023-08-20 NOTE — ED PROVIDER NOTES
Encounter Date: 2023       History       Chief Complaint  Patient presents with  · staples coming out    Just HAD C SECTION 3 DAYS AGO AND STAPLES COMING OUT. TODAY NOTICED A STAPLE IS UNATTACHED. 1 STAPLE SEEMS TO NOT BE IN PLACE.  Patient pointed to a staple in the middle which has turned in angulation it is still holding the skin edges it is just turned and tilted no evidence of any infection no abdominal pain          Review of patient's allergies indicates:  No Known Allergies  Past Medical History:   Diagnosis Date    Rubella non-immune status, antepartum 2023     Past Surgical History:   Procedure Laterality Date     SECTION  2021     SECTION N/A 2023    Procedure:  SECTION;  Surgeon: Rosa Isela Ro MD;  Location: [facility];  Service: OB/GYN;  Laterality: N/A;     No family history on file.  Social History     Tobacco Use    Smoking status: Never    Smokeless tobacco: Never   Substance Use Topics    Alcohol use: Not Currently    Drug use: Never     Review of Systems   Constitutional:  Negative for fever.   HENT:  Negative for sore throat.    Respiratory:  Negative for shortness of breath.    Cardiovascular:  Negative for chest pain.   Gastrointestinal:  Negative for nausea.   Genitourinary:  Negative for dysuria.   Musculoskeletal:  Negative for back pain.   Skin:  Negative for rash.        C-sections caught looks healthy   Neurological:  Negative for weakness.   Hematological:  Does not bruise/bleed easily.       Physical Exam     Initial Vitals [23 1521]   BP Pulse Resp Temp SpO2   124/67 78 18 98.2 °F (36.8 °C) 99 %      MAP       --         Physical Exam    Nursing note and vitals reviewed.  Constitutional: She appears well-developed.   Eyes: Pupils are equal, round, and reactive to light.   Neck:   Normal range of motion.  Cardiovascular:  Normal rate, regular rhythm, normal heart sounds and intact distal pulses.           Pulmonary/Chest: Breath sounds  normal.   Musculoskeletal:         General: Normal range of motion.      Cervical back: Normal range of motion.     Skin: Skin is warm.   Skin  section scar looks very healthy   Psychiatric: She has a normal mood and affect.         ED Course   Procedures  Labs Reviewed - No data to display       Imaging Results    None          Medications - No data to display  Medical Decision Making                             Clinical Impression:   Final diagnoses:  [Z78.9] Presence of surgical incision (Primary)        ED Disposition Condition    Discharge Stable          ED Prescriptions    None       Follow-up Information       Follow up With Specialties Details Why Contact Info    PCP  In 1 day               Ken Paul MD  23 [phone]

## 2023-08-24 ENCOUNTER — CLINICAL SUPPORT (OUTPATIENT)
Dept: OBSTETRICS AND GYNECOLOGY | Facility: CLINIC | Age: 23
End: 2023-08-24
Payer: MEDICAID

## 2023-08-24 VITALS
HEIGHT: 65 IN | WEIGHT: 165.81 LBS | SYSTOLIC BLOOD PRESSURE: 122 MMHG | DIASTOLIC BLOOD PRESSURE: 64 MMHG | BODY MASS INDEX: 27.63 KG/M2 | TEMPERATURE: 97 F

## 2023-08-24 DIAGNOSIS — Z48.02 ENCOUNTER FOR STAPLE REMOVAL: Primary | ICD-10-CM

## 2023-08-24 NOTE — NURSING
Chief Complaint:     Chief Complaint   Patient presents with    Suture / Staple Removal         HPI:   22 y.o.  presents for staple removal.   Section per Dr. Ro on 23.  +Breastfeeding.  Pt. Reports no vaginal bleeding.  Pain level 4/10 on pain scale.  Pt. Admits to taking Norco, but not ibuprofen.      LMP: recently delivered  Frequency: monthly   Cycle Length: n/a    Flow: n/a  Intermenstrual Bleeding: n/a  Postcoital Bleeding: n/a  Dysmenorrhea: n/a  Sexually Active: no   Dyspareunia: n/a  Contraception: none, +breast feeding   H/o STI: No   Last pap: n/a  H/o abnl pap: n/a   Colposcopy: n/a  Ga        Current Outpatient Medications:     HYDROcodone-acetaminophen (NORCO) 7.5-325 mg per tablet, Take 1 tablet by mouth every 6 (six) hours as needed for Pain., Disp: 12 tablet, Rfl: 0    ibuprofen (ADVIL,MOTRIN) 600 MG tablet, Take 1 tablet (600 mg total) by mouth every 6 (six) hours as needed for Pain. (Patient not taking: Reported on 2023), Disp: 30 tablet, Rfl: 0    PRENATAL VITAMIN 27 mg iron- 0.8 mg Tab, Take 1 tablet by mouth., Disp: , Rfl:     Review of patient's allergies indicates:  No Known Allergies    Social History     Tobacco Use    Smoking status: Never    Smokeless tobacco: Never   Substance Use Topics    Alcohol use: Not Currently    Drug use: Never       Review of Systems:   General/Constitutional: Chills denies. Fatigue/weakness denies. Fever denies. Night sweats denies. Hot flashes denies    Respiratory: Cough denies. Hemoptysis denies. SOB denies. Sputum production denies. Wheezing denies .   Cardiovascular: Chest pain denies . Dizziness denies. Palpitations denies. Swelling in hands/feet denies    Gastrointestinal: Abdominal pain denies. Blood in stool denies. Constipation denies. Diarrhea denies. Heartburn denies. Nausea denies. Vomiting denies    Genitourinary: Incontinence denies. Blood in urine denies. Frequent urination denies. Painful urination denies. Urinary  "urgency denies. Nocturia denies    Gynecologic: Irregular menses denies. Heavy bleeding denies. Painful menses denies. Vaginal discharge denies. Vaginal odor denies. Vaginal itching denies. Vaginal lesion denies. Pelvic pain denies. Decreased libido denies. Vulvar lesion denies. Prolapse of genital organs denies. Painful intercourse denies. Postcoital bleeding denies    Psychiatric: Depression denies. Anxiety denies       Physical Exam:   Vitals:    23 0807   BP: 122/64   BP Location: Left arm   Patient Position: Sitting   BP Method: Medium (Manual)   Temp: 97.2 °F (36.2 °C)   Weight: 75.2 kg (165 lb 12.8 oz)   Height: 5' 5" (1.651 m)       Body mass index is 27.59 kg/m².    Constitutional:       Appearance: She is well-developed  Abdominal:       General: Abdomen is flat.  Neurological:        Mental Status: She is alert and oriented to person, place and time.   Psychiatric:       Attention and Perception: Attention normal.       Mood and Affect: Mood normal. Mood is not anxious or depressed.       Assessment:     Patient Active Problem List   Diagnosis    Rubella non-immune status, antepartum    Benign gestational thrombocytopenia in third trimester    Uterine scar from previous  delivery, antepartum       Health Maintenance Due   Topic Date Due    Lipid Panel  Never done    COVID-19 Vaccine (2 - Pfizer series) 2022     Health Maintenance Topics with due status: Not Due       Topic Last Completion Date    Influenza Vaccine 2021    Chlamydia Screening 2023    TETANUS VACCINE 2023           Plan:    Freida was seen today for suture / staple removal.    Diagnoses and all orders for this visit:    Encounter for staple removal  Patient doing well today with no complaints. Incision is clean, dry, and intact.  No signs or symptoms of infection noted to surgical incision. Staples removed without difficulty and steri strips applied.  Patient tolerated procedure well.  Instructed to " keep surgical incision clean and dry. Showers only until steri-strips fall off.  Notify Physician of signs/symptoms of infection reviewed with patient.  ED precautions given.  Patient voiced understanding.     Return to clinic for 6 week postpartum visit as scheduled.

## 2023-10-12 ENCOUNTER — POSTPARTUM VISIT (OUTPATIENT)
Dept: OBSTETRICS AND GYNECOLOGY | Facility: CLINIC | Age: 23
End: 2023-10-12
Payer: MEDICAID

## 2023-10-12 VITALS
DIASTOLIC BLOOD PRESSURE: 58 MMHG | BODY MASS INDEX: 26.48 KG/M2 | HEIGHT: 65 IN | TEMPERATURE: 97 F | SYSTOLIC BLOOD PRESSURE: 98 MMHG | WEIGHT: 158.94 LBS

## 2023-10-12 DIAGNOSIS — Z11.3 SCREENING EXAMINATION FOR VENEREAL DISEASE: ICD-10-CM

## 2023-10-12 DIAGNOSIS — N91.5 OLIGOMENORRHEA, UNSPECIFIED TYPE: ICD-10-CM

## 2023-10-12 DIAGNOSIS — Z98.891 STATUS POST CESAREAN DELIVERY: ICD-10-CM

## 2023-10-12 DIAGNOSIS — Z30.9 ENCOUNTER FOR CONTRACEPTIVE MANAGEMENT, UNSPECIFIED TYPE: ICD-10-CM

## 2023-10-12 DIAGNOSIS — R10.2 PELVIC PAIN: Primary | ICD-10-CM

## 2023-10-12 LAB
B-HCG UR QL: NEGATIVE
BILIRUB UR QL STRIP: NEGATIVE
CTP QC/QA: YES
GLUCOSE UR QL STRIP: NEGATIVE
KETONES UR QL STRIP: NEGATIVE
LEUKOCYTE ESTERASE UR QL STRIP: NEGATIVE
PH, POC UA: 7
POC BLOOD, URINE: NEGATIVE
POC NITRATES, URINE: NEGATIVE
PROT UR QL STRIP: POSITIVE
SP GR UR STRIP: 1.02 (ref 1–1.03)
UROBILINOGEN UR STRIP-ACNC: 1 (ref 0.1–1.1)

## 2023-10-12 PROCEDURE — 87491 CHLMYD TRACH DNA AMP PROBE: CPT | Performed by: OBSTETRICS & GYNECOLOGY

## 2023-10-12 PROCEDURE — 81025 URINE PREGNANCY TEST: CPT | Mod: ,,, | Performed by: OBSTETRICS & GYNECOLOGY

## 2023-10-12 PROCEDURE — 87591 N.GONORRHOEAE DNA AMP PROB: CPT | Performed by: OBSTETRICS & GYNECOLOGY

## 2023-10-12 PROCEDURE — 59430 PR CARE AFTER DELIVERY ONLY: ICD-10-PCS | Mod: ,,, | Performed by: OBSTETRICS & GYNECOLOGY

## 2023-10-12 PROCEDURE — 81025 POCT URINE PREGNANCY: ICD-10-PCS | Mod: ,,, | Performed by: OBSTETRICS & GYNECOLOGY

## 2023-10-12 PROCEDURE — 81003 POCT URINALYSIS, DIPSTICK, AUTOMATED, W/O SCOPE: ICD-10-PCS | Mod: QW,,, | Performed by: OBSTETRICS & GYNECOLOGY

## 2023-10-12 PROCEDURE — 81003 URINALYSIS AUTO W/O SCOPE: CPT | Mod: QW,,, | Performed by: OBSTETRICS & GYNECOLOGY

## 2023-10-12 PROCEDURE — 87661 TRICHOMONAS VAGINALIS AMPLIF: CPT | Performed by: OBSTETRICS & GYNECOLOGY

## 2023-10-12 RX ORDER — MEDROXYPROGESTERONE ACETATE 150 MG/ML
150 INJECTION, SUSPENSION INTRAMUSCULAR
Status: COMPLETED | OUTPATIENT
Start: 2023-10-12 | End: 2023-10-12

## 2023-10-12 RX ADMIN — MEDROXYPROGESTERONE ACETATE 150 MG: 150 INJECTION, SUSPENSION INTRAMUSCULAR at 04:10

## 2023-10-12 NOTE — PROGRESS NOTES
Chief Complaint:  Postpartum Care   History of Present Illness:   Freida Tim is a 23 y.o. female  status post CS (23) presents for her 6 week postpartum visit. Denies any complaints. Infant doing well, breast feeding. Lochia resolved. Mood is good. Desires to discuss contraceptive management.       Spotting for past 2 days.  NO cycle since delivery.  +intercourse since delivery.   Occ pain, improved since CS. Denies pain today. Desires Depo Provera  for Contraception.       Review of Systems:  General/Constitutional: Chills denies. Fatigue/weakness denies. Fever denies. Night sweats denies. Hot flashes denies  Gastrointestinal: Abdominal pain denies. Blood in stool denies. Constipation denies. Diarrhea denies. Heartburn denies. Nausea denies. Vomiting denies   Genitourinary: Incontinence denies. Blood in urine denies. Frequent urination denies. Urgency denies. Painful urination denies. Nocturia denies   Gynecologic: Irregular menses denies. Heavy bleeding  denies. Painful menses denies. Vaginal discharge denies.Vaginal odor denies. Vaginal itching/Irritation denies. Vaginal lesion denies.  Pelvic pain denies. Decreased libido denies. Vulvar lesion denies. Prolapse of genital organs denies. Painful intercourse denies. Postcoital bleeding denies   Psychiatric: Mood lability denies. Depressed mood denies. Suicidal thoughts denies. Anxiety denies. Overwhelmed denies. Appetite normal. Energy level normal     OB History    Para Term  AB Living   2 2 2 0 0 2   SAB IAB Ectopic Multiple Live Births   0 0 0 0 2      # 1 - Date: 21, Sex: Female, Weight: 2.637 kg (5 lb 13 oz), GA: 38w0d, Delivery: , Low Transverse, Apgar1: None, Apgar5: None, Living: Living, Birth Comments: None    # 2 - Date: 23, Sex: Male, Weight: 2.96 kg (6 lb 8.4 oz), GA: 39w1d, Delivery: , Low Transverse, Apgar1: 8, Apgar5: 6, Living: Living, Birth Comments: None      Past Medical History:  "  Diagnosis Date    Rubella non-immune status, antepartum 2023         Current Outpatient Medications:     PRENATAL VITAMIN 27 mg iron- 0.8 mg Tab, Take 1 tablet by mouth., Disp: , Rfl:     HYDROcodone-acetaminophen (NORCO) 7.5-325 mg per tablet, Take 1 tablet by mouth every 6 (six) hours as needed for Pain., Disp: 12 tablet, Rfl: 0      Physical Exam:  BP (!) 98/58 (BP Location: Right arm, Patient Position: Sitting, BP Method: Medium (Manual))   Temp 97.2 °F (36.2 °C) (Temporal)   Ht 5' 5" (1.651 m)   Wt 72.1 kg (158 lb 15.2 oz)   LMP 10/26/2022   Breastfeeding Yes   BMI 26.45 kg/m²      Chaperone present.      Constitutional: General appearance: healthy, well-nourished and well-developed  Psychiatric: Orientation to time, place and person. Normal mood and affect and active, alert   Breast: Inspection/palpation: no tenderness, masses, skin changes or abnormal secretions   Abdomen:   Auscultation/Inspection/Palpation: No tenderness or masses. Soft, nondistended   Inspection of incision site: well healed, clean, dry, intact and non-tender    Hernia: no palpable hernia         Assessment/Plan:  1. Pelvic pain  -     POCT Urinalysis, Dipstick, Automated, W/O Scope  - gc/cz/tv/myco/urea  Educated    NSAIDs, heating pad, warm bath    Pain/ER precautions     2. Status post  delivery    3. Oligomenorrhea, unspecified type  -     POCT urine pregnancy: neg    4. Encounter for contraceptive management, unspecified type  -     POCT urine pregnancy     Patient doing well  Diet exercise encouraged  Multivitamin    Discussed with patient different contraceptive management including family-planning, barrier, oral contraceptive, patch, NuvaRing, Depo-Provera, Nexplanon, IUDs.     Patient desires DMPA    Educated patient it is strongly advised by American [employer] of [employer]ne[employer] as well as Society for Maternal Fetal [employer] Specialists to receive the COVID 19 vaccine.         "

## 2023-10-14 LAB
C TRACH RRNA SPEC QL NAA+PROBE: NEGATIVE
N GONORRHOEA RRNA SPEC QL NAA+PROBE: NEGATIVE
SPECIMEN SOURCE: NORMAL
SPECIMEN SOURCE: NORMAL

## 2023-10-16 LAB — T VAGINALIS RRNA SPEC QL NAA+PROBE: NORMAL

## 2023-10-17 ENCOUNTER — TELEPHONE (OUTPATIENT)
Dept: OBSTETRICS AND GYNECOLOGY | Facility: CLINIC | Age: 23
End: 2023-10-17
Payer: MEDICAID

## 2023-10-17 DIAGNOSIS — Z22.39 CARRIER OF UREAPLASMA UREALYTICUM: Primary | ICD-10-CM

## 2023-10-17 RX ORDER — DOXYCYCLINE 100 MG/1
100 CAPSULE ORAL 2 TIMES DAILY
Qty: 14 CAPSULE | Refills: 0 | Status: SHIPPED | OUTPATIENT
Start: 2023-10-17 | End: 2023-10-24

## 2023-10-17 NOTE — TELEPHONE ENCOUNTER
----- Message from Rosa Isela Ro MD sent at 10/17/2023  3:36 PM CDT -----  + Ureaplasma  Educate  Confirm not pregnant  Doxy 100mg BID x7  Needs PRINCE 1 mo    Can MDL  add TV? I don't see results

## 2023-10-17 NOTE — PROGRESS NOTES
+ Ureaplasma  Educate  Confirm not pregnant  Doxy 100mg BID x7  Needs PRINCE 1 mo    Can MDL  add TV? I don't see results

## 2023-10-17 NOTE — TELEPHONE ENCOUNTER
Phoned patient, notified +Ureaplasma. Treat per protocol with Doxy. Medication sent to the pharmacy. PRINCE in 1 month. Verbalized understanding.

## 2023-10-18 NOTE — TELEPHONE ENCOUNTER
----- Message from Catherine Rhoades sent at 10/17/2023 10:02 AM CDT -----  Regarding: Call Back  Type:  Patient Returning Call    Who Called:  Who Left Message for Patient:  Does the patient know what this is regarding?:  Would the patient rather a call back or a response via [facility]?   Best Call Back Number:[phone]  Additional Information: The trich lab is being rejected per Cami in lab. Please call.

## 2023-10-19 ENCOUNTER — TELEPHONE (OUTPATIENT)
Dept: OBSTETRICS AND GYNECOLOGY | Facility: CLINIC | Age: 23
End: 2023-10-19
Payer: MEDICAID

## 2023-11-14 NOTE — PROGRESS NOTES
Chief Complaint:  Follow-up     History of Present Illness:  Freida Tim is a 23 y.o.  who presents with previous diagnosis of ureaplasma for a test of cure. She completed her antibiotics with no problems. She denies vaginal itching, odor, or discharge.       LMP: constant w/ depo  Frequency: n/a  Cycle Length: n/a    Flow: n/a  Intermenstrual Bleeding: no  Postcoital Bleeding: no  Dysmenorrhea: no  Sexually Active: Yes  Dyspareunia: No  Contraception: Depo provera  H/o STI: No   Last pap: no history  H/o abnl pap: n/a   Colposcopy: n/a  Gardasil: 2/3   MMG: n/a  H/o abnl MMG: n/a  Colonoscopy: n/a      Review of Systems:  General/Constitutional: Chills denies. Fatigue/weakness denies. Fever denies. Night sweats denies. Hot flashes denies  Gastrointestinal: Abdominal pain denies. Blood in stool denies. Constipation denies. Diarrhea denies. Heartburn denies. Nausea denies. Vomiting denies   Genitourinary: Incontinence denies. Blood in urine denies. Frequent urination denies. Urgency denies. Painful urination denies. Nocturia denies   Gynecologic: Irregular menses denies. Heavy bleeding denies. Painful menses denies. Vaginal discharge denies. Vaginal odor denies. Vaginal itching/Irritation denies. Vaginal lesion denies.  Pelvic pain denies. Decreased libido denies. Vulvar lesion denies. Prolapse of genital organs denies. Painful intercourse denies. Postcoital bleeding denies   Psychiatric: Mood lability denies. Depressed mood denies. Suicidal thoughts denies. Anxiety denies. Overwhelmed denies. Appetite normal. Energy level normal     OB History    Para Term  AB Living   2 2 2 0 0 2   SAB IAB Ectopic Multiple Live Births   0 0 0 0 2      # 1 - Date: 21, Sex: Female, Weight: 2.637 kg (5 lb 13 oz), GA: 38w0d, Delivery: , Low Transverse, Apgar1: None, Apgar5: None, Living: Living, Birth Comments: None    # 2 - Date: 23, Sex: Male, Weight: 2.96 kg (6 lb 8.4 oz), GA: 39w1d,  "Delivery: , Low Transverse, Apgar1: 8, Apgar5: 6, Living: Living, Birth Comments: None      Past Medical History:   Diagnosis Date    Rubella non-immune status, antepartum 2023       Current Outpatient Medications:     medroxyPROGESTERone (DEPO-PROVERA) 150 mg/mL injection, Inject 150 mg into the muscle every 3 (three) months., Disp: , Rfl:       Physical Exam:  /70   Ht 5' 5" (1.651 m)   Wt 71.5 kg (157 lb 10.1 oz)   Breastfeeding Yes   BMI 26.23 kg/m²     Chaperone present.       Constitutional: General appearance: healthy, well-nourished and well-developed   Psychiatric: Orientation to time, place and person. Normal mood and affect and active, alert   Abdomen: Auscultation/Inspection/Palpation: No tenderness or masses. Soft, nondistended    Female Genitalia:      Vulva: no masses, atrophy or lesions      Bladder/Urethra: no urethral discharge or mass, normal meatus, bladder non-distended.      Vagina: no tenderness, erythema, cystocele, rectocele, abnormal vaginal discharge, or vesicle(s) or ulcers                   Cervix: no discharge or cervical motion tenderness and grossly normal      Uterus: normal size and shape and midline, non-tender, and no uterine prolapse.      Adnexa/Parametria: no parametrial tenderness or mass, no adnexal tenderness or ovarian mass.       Assessment/Plan:  1. Carrier of ureaplasma urealyticum   Educated  Safe sex education    10/12/23 +urea; treated w/ doxy  PRINCE today; urea    Counseled on safe sex practices including barrier methods such as condoms to protect against STIs.       "

## 2023-11-15 ENCOUNTER — OFFICE VISIT (OUTPATIENT)
Dept: OBSTETRICS AND GYNECOLOGY | Facility: CLINIC | Age: 23
End: 2023-11-15
Payer: MEDICAID

## 2023-11-15 VITALS
HEIGHT: 65 IN | DIASTOLIC BLOOD PRESSURE: 70 MMHG | WEIGHT: 157.63 LBS | BODY MASS INDEX: 26.26 KG/M2 | SYSTOLIC BLOOD PRESSURE: 118 MMHG

## 2023-11-15 DIAGNOSIS — Z22.39 CARRIER OF UREAPLASMA UREALYTICUM: Primary | ICD-10-CM

## 2023-11-15 PROCEDURE — 99213 OFFICE O/P EST LOW 20 MIN: CPT | Mod: 24,,, | Performed by: OBSTETRICS & GYNECOLOGY

## 2023-11-15 PROCEDURE — 3008F BODY MASS INDEX DOCD: CPT | Mod: CPTII,,, | Performed by: OBSTETRICS & GYNECOLOGY

## 2023-11-15 PROCEDURE — 3074F PR MOST RECENT SYSTOLIC BLOOD PRESSURE < 130 MM HG: ICD-10-PCS | Mod: CPTII,,, | Performed by: OBSTETRICS & GYNECOLOGY

## 2023-11-15 PROCEDURE — 99213 PR OFFICE/OUTPT VISIT, EST, LEVL III, 20-29 MIN: ICD-10-PCS | Mod: 24,,, | Performed by: OBSTETRICS & GYNECOLOGY

## 2023-11-15 PROCEDURE — 3078F PR MOST RECENT DIASTOLIC BLOOD PRESSURE < 80 MM HG: ICD-10-PCS | Mod: CPTII,,, | Performed by: OBSTETRICS & GYNECOLOGY

## 2023-11-15 PROCEDURE — 3074F SYST BP LT 130 MM HG: CPT | Mod: CPTII,,, | Performed by: OBSTETRICS & GYNECOLOGY

## 2023-11-15 PROCEDURE — 1159F PR MEDICATION LIST DOCUMENTED IN MEDICAL RECORD: ICD-10-PCS | Mod: CPTII,,, | Performed by: OBSTETRICS & GYNECOLOGY

## 2023-11-15 PROCEDURE — 3078F DIAST BP <80 MM HG: CPT | Mod: CPTII,,, | Performed by: OBSTETRICS & GYNECOLOGY

## 2023-11-15 PROCEDURE — 1159F MED LIST DOCD IN RCRD: CPT | Mod: CPTII,,, | Performed by: OBSTETRICS & GYNECOLOGY

## 2023-11-15 PROCEDURE — 3008F PR BODY MASS INDEX (BMI) DOCUMENTED: ICD-10-PCS | Mod: CPTII,,, | Performed by: OBSTETRICS & GYNECOLOGY

## 2023-11-15 RX ORDER — MEDROXYPROGESTERONE ACETATE 150 MG/ML
150 INJECTION, SUSPENSION INTRAMUSCULAR
COMMUNITY
Start: 2023-10-12 | End: 2024-01-22

## 2024-01-19 NOTE — PROGRESS NOTES
Chief Complaint:  Contraception    History of Present Illness:  Freida is a 23 y.o.  who presents today to discuss birth control options. Currently on depo provera, first and last injection was 10/12/2023. No longer desires DMPA.       LMP: no since last DMPA   Frequency: n/a  Cycle length: n/a    Flow: n/a  Intermenstrual bleeding: no  Postcoital bleeding: no  Dysmenorrhea: no  Sexually active: Yes  Dyspareunia: No  Contraception: DMPA  H/o STI: No  Last pap: no history  H/o abnl pap: n/a   Colposcopy: n/a  Gardasil: 2/3   MMG: n/a  H/o abnl MMG: n/a  Colonoscopy: n/a      Review of Systems:  General/Constitutional: Chills denies. Fatigue/weakness denies. Fever denies. Night sweats denies. Hot flashes denies  Gastrointestinal: Abdominal pain denies. Blood in stool denies. Constipation denies. Diarrhea denies. Heartburn denies. Nausea denies. Vomiting denies   Genitourinary: Incontinence denies. Blood in urine denies. Frequent urination denies. Urgency denies. Painful urination denies. Nocturia denies   Gynecologic: Irregular menses denies. Heavy bleeding  denies. Painful menses denies. Vaginal discharge denies.Vaginal odor denies. Vaginal itching/Irritation denies. Vaginal lesion denies.  Pelvic pain denies. Decreased libido denies. Vulvar lesion denies. Prolapse of genital organs denies. Painful intercourse denies. Postcoital bleeding denies   Psychiatric: Mood lability denies. Depressed mood denies. Suicidal thoughts denies. Anxiety denies. Overwhelmed denies. Appetite normal. Energy level normal     OB History    Para Term  AB Living   2 2 2 0 0 2   SAB IAB Ectopic Multiple Live Births   0 0 0 0 2      # 1 - Date: 23, Sex: Female, Weight: 2.637 kg (5 lb 13 oz), GA: 38w0d, Delivery: , Low Transverse, Apgar1: None, Apgar5: None, Living: Living, Birth Comments: None    # 2 - Date: 25, Sex: Male, Weight: 2.96 kg (6 lb 8.4 oz), GA: 39w1d, Delivery: , Low  "Transverse, Apgar1: 8, Apgar5: 6, Living: Living, Birth Comments: None      Past Medical History:   Diagnosis Date    Rubella non-immune status, antepartum 5/29/2023     No current outpatient medications on file.    Review of patient's allergies indicates:  No Known Allergies  Social History     Socioeconomic History    Marital status: Single   Tobacco Use    Smoking status: Never     Passive exposure: Never    Smokeless tobacco: Never   Substance and Sexual Activity    Alcohol use: Not Currently    Drug use: Never    Sexual activity: Yes     Partners: Male     Birth control/protection: None     Comment: STI:  None         Physical Exam:  /68   Temp 98.1 °F (36.7 °C)   Ht 5' 6" (1.676 m)   Wt 76.9 kg (169 lb 9.6 oz)   BMI 27.37 kg/m²       Constitutional: General appearance: healthy, well-nourished and well-developed   Psychiatric:  Orientation to time, place and person. Normal mood and affect and active, alert       Assessment/Plan:  1. Encounter for contraceptive management  No longer desires DMPA    Explained common options for contraception including natural family planning, barrier methods, depo-provera, ocps, patch, nuvaring, IUD, Nexplanon, and sterilization.        Discussed that pills should be taken at the same time every day to minimize breakthrough bleeding and to expect breakthrough bleeding for the first 3 months and then it should resolve. If BTB continues after 3 months, a change may be necessary.        Advised patient that smoking is harmful due to increased risks of stroke, heart attack and blood clots when taking pills. Patient to contact us immediately if she experiences severe abdominal pain, severe chest pain, severe headaches, eye-visual changes, severe leg pain or SOB.       Discussed that birth control do not protect against STDs.  Rx: Sprintec  Follow up 3 months       "

## 2024-01-22 ENCOUNTER — OFFICE VISIT (OUTPATIENT)
Dept: OBSTETRICS AND GYNECOLOGY | Facility: CLINIC | Age: 24
End: 2024-01-22
Payer: MEDICAID

## 2024-01-22 VITALS
WEIGHT: 169.63 LBS | TEMPERATURE: 98 F | HEIGHT: 66 IN | BODY MASS INDEX: 27.26 KG/M2 | SYSTOLIC BLOOD PRESSURE: 110 MMHG | DIASTOLIC BLOOD PRESSURE: 68 MMHG

## 2024-01-22 DIAGNOSIS — Z30.9 ENCOUNTER FOR CONTRACEPTIVE MANAGEMENT, UNSPECIFIED TYPE: Primary | ICD-10-CM

## 2024-01-22 LAB
B-HCG UR QL: NEGATIVE
CTP QC/QA: YES

## 2024-01-22 PROCEDURE — 3078F DIAST BP <80 MM HG: CPT | Mod: CPTII,,, | Performed by: OBSTETRICS & GYNECOLOGY

## 2024-01-22 PROCEDURE — 81025 URINE PREGNANCY TEST: CPT | Mod: ,,, | Performed by: OBSTETRICS & GYNECOLOGY

## 2024-01-22 PROCEDURE — 3074F SYST BP LT 130 MM HG: CPT | Mod: CPTII,,, | Performed by: OBSTETRICS & GYNECOLOGY

## 2024-01-22 PROCEDURE — 99213 OFFICE O/P EST LOW 20 MIN: CPT | Mod: ,,, | Performed by: OBSTETRICS & GYNECOLOGY

## 2024-01-22 PROCEDURE — 3008F BODY MASS INDEX DOCD: CPT | Mod: CPTII,,, | Performed by: OBSTETRICS & GYNECOLOGY

## 2024-01-22 PROCEDURE — 1159F MED LIST DOCD IN RCRD: CPT | Mod: CPTII,,, | Performed by: OBSTETRICS & GYNECOLOGY

## 2024-01-22 RX ORDER — NORGESTIMATE AND ETHINYL ESTRADIOL 0.25-0.035
1 KIT ORAL DAILY
Qty: 28 TABLET | Refills: 2 | Status: SHIPPED | OUTPATIENT
Start: 2024-01-22 | End: 2024-04-17 | Stop reason: SDUPTHER

## 2024-04-16 NOTE — PROGRESS NOTES
Chief Complaint:  f/u sprintec    History of Present Illness:  Patient is a 23 y.o.  presents to follow up Sprintec for contraceptive use. Having regular monthly cycles lasting 7 days with moderate flow. Content, desires to continue.      LMP: 3/26/24  Frequency: Monthly  Cycle length: 7 days    Flow: Moderate  Intermenstrual bleeding: no  Postcoital bleeding: no  Dysmenorrhea: no  Sexually active: Yes  Dyspareunia: No  Contraception: Sprintec   H/o STI: No  Last pap: no history  H/o abnl pap: n/a   Colposcopy: n/a  Gardasil: /3   MMG: n/a  H/o abnl MMG: n/a  Colonoscopy: n/a      Review of systems:  General/Constitutional: Chills denies. Fatigue/weakness denies. Fever denies. Night sweats denies. Hot flashes denies  Breast: Dimpling denies. Breast mass denies. Breast pain/tenderness denies. Nipple discharge denies. Puckering denies.  Gastrointestinal: Abdominal pain denies. Blood in stool denies. Constipation denies. Diarrhea denies. Heartburn denies. Nausea denies. Vomiting denies   Genitourinary: Incontinence denies. Blood in urine denies. Frequent urination denies. Urgency denies. Painful urination denies. Nocturia denies   Gynecologic: Irregular menses denies. Heavy bleeding denies. Painful menses denies. Vaginal discharge denies. Vaginal odor denies. Vaginal itching/Irritation denies. Vaginal lesion denies.  Pelvic pain denies. Decreased libido denies. Vulvar lesion denies. Prolapse of genital organs denies. Painful intercourse denies. Postcoital bleeding denies   Psychiatric: Mood lability denies. Depressed mood denies. Suicidal thoughts denies. Anxiety denies. Overwhelmed denies. Appetite normal. Energy level normal.     OB History    Para Term  AB Living   2 2 2 0 0 2   SAB IAB Ectopic Multiple Live Births   0 0 0 0 2      # 1 - Date: 23, Sex: Female, Weight: 2.637 kg (5 lb 13 oz), GA: 38w0d, Type: , Low Transverse, Apgar1: None, Apgar5: None, Living: Living, Birth  "Comments: None    # 2 - Date: 25, Sex: Male, Weight: 2.96 kg (6 lb 8.4 oz), GA: 39w1d, Type: , Low Transverse, Apgar1: 8, Apgar5: 6, Living: Living, Birth Comments: None      Past Medical History:   Diagnosis Date    Rubella non-immune status, antepartum 2023       Current Outpatient Medications:     norgestimate-ethinyl estradioL (ORTHO-CYCLEN) 0.25-35 mg-mcg per tablet, Take 1 tablet by mouth once daily., Disp: 28 tablet, Rfl: 2      Physical Exam:  /68   Ht 5' 6" (1.676 m)   Wt 76.4 kg (168 lb 6.9 oz)   LMP 2024 (Exact Date)   Breastfeeding No   BMI 27.19 kg/m²     Constitutional: General appearance: healthy, well-nourished and well-developed   Psychiatric:  Orientation to time, place and person. Normal mood and affect and active, alert   Abdomen: Auscultation/Inspection/Palpation: No tenderness or masses. Soft, nondistended       Assessment/Plan:  1. Encounter for surveillance of contraceptive pills  Explained common options for contraception including natural family planning, barrier methods, depo-provera, ocps, patch, nuvaring, IUD, Nexplanon, and sterilization.       Discussed that pills should be taken at the same time every day to minimize breakthrough bleeding and to decrease failure rate.        Advised patient that smoking is harmful due to increased risks of stroke, heart attack and blood clots when taking estrogen. Patient to contact us immediately if she experiences severe abdominal pain, severe chest pain, severe headaches, eye-visual changes, severe leg pain or SOB.       Discussed that birth control, such as pills, Nuva Ring, Patch or Depo Provera, Nexplanon, IUDs do not protect against STDs.     Desires to continue Sprintec  RTC for annual or PRN      This note was transcribed by Chelsie Kaminski MA. There may be transcription errors as a result, however minimal. Effort has been made to ensure accuracy of transcription, but any obvious errors or omissions should be " clarified with the author of the document.

## 2024-04-17 ENCOUNTER — OFFICE VISIT (OUTPATIENT)
Dept: OBSTETRICS AND GYNECOLOGY | Facility: CLINIC | Age: 24
End: 2024-04-17
Payer: MEDICAID

## 2024-04-17 VITALS
BODY MASS INDEX: 27.07 KG/M2 | WEIGHT: 168.44 LBS | DIASTOLIC BLOOD PRESSURE: 68 MMHG | HEIGHT: 66 IN | SYSTOLIC BLOOD PRESSURE: 110 MMHG

## 2024-04-17 DIAGNOSIS — Z30.41 ENCOUNTER FOR SURVEILLANCE OF CONTRACEPTIVE PILLS: Primary | ICD-10-CM

## 2024-04-17 DIAGNOSIS — Z30.9 ENCOUNTER FOR CONTRACEPTIVE MANAGEMENT, UNSPECIFIED TYPE: ICD-10-CM

## 2024-04-17 PROCEDURE — 3074F SYST BP LT 130 MM HG: CPT | Mod: CPTII,,, | Performed by: OBSTETRICS & GYNECOLOGY

## 2024-04-17 PROCEDURE — 99213 OFFICE O/P EST LOW 20 MIN: CPT | Mod: ,,, | Performed by: OBSTETRICS & GYNECOLOGY

## 2024-04-17 PROCEDURE — 1159F MED LIST DOCD IN RCRD: CPT | Mod: CPTII,,, | Performed by: OBSTETRICS & GYNECOLOGY

## 2024-04-17 PROCEDURE — 3008F BODY MASS INDEX DOCD: CPT | Mod: CPTII,,, | Performed by: OBSTETRICS & GYNECOLOGY

## 2024-04-17 PROCEDURE — 3078F DIAST BP <80 MM HG: CPT | Mod: CPTII,,, | Performed by: OBSTETRICS & GYNECOLOGY

## 2024-04-17 RX ORDER — NORGESTIMATE AND ETHINYL ESTRADIOL 0.25-0.035
1 KIT ORAL DAILY
Qty: 84 TABLET | Refills: 3 | Status: SHIPPED | OUTPATIENT
Start: 2024-04-17 | End: 2025-04-17

## 2024-10-24 ENCOUNTER — OFFICE VISIT (OUTPATIENT)
Dept: OBSTETRICS AND GYNECOLOGY | Facility: CLINIC | Age: 24
End: 2024-10-24

## 2024-10-24 VITALS
HEIGHT: 66 IN | WEIGHT: 153 LBS | BODY MASS INDEX: 24.59 KG/M2 | TEMPERATURE: 97 F | SYSTOLIC BLOOD PRESSURE: 108 MMHG | DIASTOLIC BLOOD PRESSURE: 68 MMHG

## 2024-10-24 DIAGNOSIS — Z30.9 ENCOUNTER FOR CONTRACEPTIVE MANAGEMENT, UNSPECIFIED TYPE: ICD-10-CM

## 2024-10-24 DIAGNOSIS — N92.6 IRREGULAR MENSES: Primary | ICD-10-CM

## 2024-10-24 DIAGNOSIS — Z12.4 CERVICAL CANCER SCREENING: ICD-10-CM

## 2024-10-24 LAB
B-HCG UR QL: NEGATIVE
CTP QC/QA: YES

## 2024-10-24 PROCEDURE — 87591 N.GONORRHOEAE DNA AMP PROB: CPT | Performed by: OBSTETRICS & GYNECOLOGY

## 2024-10-24 PROCEDURE — 87661 TRICHOMONAS VAGINALIS AMPLIF: CPT | Performed by: OBSTETRICS & GYNECOLOGY

## 2024-10-24 PROCEDURE — 87491 CHLMYD TRACH DNA AMP PROBE: CPT | Performed by: OBSTETRICS & GYNECOLOGY

## 2024-10-24 RX ORDER — NORGESTIMATE AND ETHINYL ESTRADIOL 0.25-0.035
1 KIT ORAL DAILY
Qty: 84 TABLET | Refills: 3 | Status: SHIPPED | OUTPATIENT
Start: 2024-10-24 | End: 2025-10-24

## 2024-12-05 ENCOUNTER — TELEPHONE (OUTPATIENT)
Dept: OBSTETRICS AND GYNECOLOGY | Facility: CLINIC | Age: 24
End: 2024-12-05

## 2024-12-05 NOTE — TELEPHONE ENCOUNTER
Spoke with patient and notified her that her Hgb/HCT was borderline, but never diagnosed anemic.  Patient desires copy of result.  Patient notified to come and sign a ROBEL for the results.  Patient voiced understanding and agrees with plan of care.

## 2024-12-05 NOTE — TELEPHONE ENCOUNTER
----- Message from Catherine sent at 12/5/2024  2:06 PM CST -----  Regarding: Call Back  Type:  Patient Returning Call    Who Called:  Who Left Message for Patient:  Does the patient know what this is regarding?:  Would the patient rather a call back or a response via [facility]?   Best Call Back Number:[phone]  Additional Information: Pt wants to know if she had anemia in either pregnancy.

## 2025-02-20 NOTE — PROGRESS NOTES
Chief Complaint:   Late Period     History of Present Illness:  Freida Tim is a 24 y.o. year old  presents c/o missed cycle. Patient's last menstrual period was 2025.. Prior to this, having regular monthly cycles. Sexually active. Denies vaginal bleeding, discharge or pelvic pain.       Gyn History:  Menstrual History  Cycle: Yes  Menarche Age: 17 years  Flow Duration: 7  Flow: Normal  Intermenstrual Bleeding: No  Dysmenorrhea: Yes  Dysmenorrhea Severity : Mild    Menopause  Menopause Age: 2 years    Pap History  Last pap date: 10/24/24  Result: Normal  History of Abnormal Pap: No  HPV Vaccine Completed: Yes  HPV Vaccine Injection Type: 2 Injection Series    [place]  Sexually Active: Yes  Sexual Orientation: heterosexual  Postcoital Bleeding: No  Dyspareunia: No  STI History: No  Contraception: No    Breast History  Last Breast Imaging Date: No  History of Breast Biopsy: No        Review of Systems:  General/Constitutional: Chills denies. Fatigue/weakness denies. Fever denies. Night sweats denies. Hot flashes denies.  Gastrointestinal: Abdominal pain denies. Blood in stool denies. Constipation denies. Diarrhea denies. Heartburn denies. Nausea denies. Vomiting denies.   Genitourinary: Incontinence denies. Blood in urine denies. Frequent urination denies. Urgency denies. Painful urination denies. Nocturia denies.  Gynecologic: Irregular menses denies. Heavy bleeding denies. Painful menses denies. Vaginal discharge denies. Vaginal odor denies. Vaginal itching/Irritation denies. Vaginal lesion denies.  Pelvic pain denies. Decreased libido denies. Vulvar lesion denies. Prolapse of genital organs denies. Painful intercourse denies. Postcoital bleeding denies.   Psychiatric: Mood lability denies. Depressed mood denies. Suicidal thoughts denies. Anxiety denies. Overwhelmed denies. Appetite normal. Energy level normal.    OB History    Para Term  AB Living   2 2 2 0 0 2   SAB IAB  "Ectopic Multiple Live Births   0 0 0 0 2      # 1 - Date: 23, Sex: Female, Weight: 2.637 kg (5 lb 13 oz), GA: 38w0d, Type: , Low Transverse, Apgar1: None, Apgar5: None, Living: Living, Birth Comments: None    # 2 - Date: 25, Sex: Male, Weight: 2.96 kg (6 lb 8.4 oz), GA: 39w1d, Type: , Low Transverse, Apgar1: 8, Apgar5: 6, Living: Living, Birth Comments: None      Past Medical History:   Diagnosis Date    Rubella non-immune status, antepartum 2023       Past Surgical History:   Procedure Laterality Date     SECTION  2021     SECTION N/A 2023    Procedure:  SECTION;  Surgeon: Rosa Isela Ro MD;  Location: [facility];  Service: OB/GYN;  Laterality: N/A;        Current Medications[1]    Social History[2]      Physical Exam:  /68   Temp 96.4 °F (35.8 °C)   Ht 5' 6" (1.676 m)   Wt 69.4 kg (153 lb)   LMP 2025   Breastfeeding No   BMI 24.69 kg/m²     Chaperone present.       Constitutional: General appearance: healthy, well-nourished and well-developed  Psychiatric:  Orientation to time, place and person. Normal mood and affect and active, alert   Abdomen: Auscultation/Inspection/Palpation: No tenderness or masses. Soft, nondistended      Female Genitalia:      Vulva: no masses, atrophy or lesions      Bladder/Urethra: no urethral discharge or mass, normal meatus, bladder non-distended.      Vagina: no tenderness, erythema, cystocele, rectocele, abnormal vaginal discharge, or vesicle(s) or ulcers                   Cervix: no discharge or cervical motion tenderness and grossly normal      Uterus: normal size and shape and midline, non-tender, and no uterine prolapse.      Adnexa/Parametria: no parametrial tenderness or mass, no adnexal tenderness or ovarian mass.       Assessment/Plan:  1. Late menses  UPT+   Educated   No VLADIMIR   PNV, folic acid  Cultures: gc/cz/tv  Pain/fever/bleeding prec     Based on Patient's last menstrual period was " 01/12/2025. , at 5w5d with ZOHRA of 10/19/2025  Follow up new ob visit in 3 weeks        This note was transcribed by Chelsie Kaminski MA. There may be transcription errors as a result, however minimal. I agree with transcription and every effort has been made to ensure accuracy of transcription, but any obvious errors or omissions should be clarified with the author of the document.             [1] No current outpatient medications on file.  [2]   Social History  Socioeconomic History    Marital status: Single   Tobacco Use    Smoking status: Never     Passive exposure: Never    Smokeless tobacco: Never   Substance and Sexual Activity    Alcohol use: Not Currently     Alcohol/week: 5.0 standard drinks of alcohol     Types: 3 Glasses of wine, 2 Drinks containing 0.5 oz of alcohol per week     Comment: Socially    Drug use: Never    Sexual activity: Yes     Partners: Male     Comment: STI:  None

## 2025-02-21 ENCOUNTER — OFFICE VISIT (OUTPATIENT)
Dept: OBSTETRICS AND GYNECOLOGY | Facility: CLINIC | Age: 25
End: 2025-02-21
Payer: MEDICAID

## 2025-02-21 VITALS
HEIGHT: 66 IN | BODY MASS INDEX: 24.59 KG/M2 | WEIGHT: 153 LBS | TEMPERATURE: 96 F | SYSTOLIC BLOOD PRESSURE: 112 MMHG | DIASTOLIC BLOOD PRESSURE: 68 MMHG

## 2025-02-21 DIAGNOSIS — Z11.3 ENCOUNTER FOR SCREENING FOR INFECTIONS WITH PREDOMINANTLY SEXUAL MODE OF TRANSMISSION: ICD-10-CM

## 2025-02-21 DIAGNOSIS — N91.5 OLIGOMENORRHEA, UNSPECIFIED TYPE: Primary | ICD-10-CM

## 2025-02-21 LAB
B-HCG UR QL: POSITIVE
CTP QC/QA: YES

## 2025-02-21 PROCEDURE — 87491 CHLMYD TRACH DNA AMP PROBE: CPT | Performed by: OBSTETRICS & GYNECOLOGY

## 2025-02-21 PROCEDURE — 87661 TRICHOMONAS VAGINALIS AMPLIF: CPT | Performed by: OBSTETRICS & GYNECOLOGY

## 2025-02-24 ENCOUNTER — OFFICE VISIT (OUTPATIENT)
Dept: OBSTETRICS AND GYNECOLOGY | Facility: CLINIC | Age: 25
End: 2025-02-24
Payer: MEDICAID

## 2025-02-24 ENCOUNTER — LAB VISIT (OUTPATIENT)
Dept: LAB | Facility: HOSPITAL | Age: 25
End: 2025-02-24
Attending: OBSTETRICS & GYNECOLOGY
Payer: MEDICAID

## 2025-02-24 VITALS
DIASTOLIC BLOOD PRESSURE: 78 MMHG | TEMPERATURE: 97 F | WEIGHT: 151 LBS | BODY MASS INDEX: 24.27 KG/M2 | SYSTOLIC BLOOD PRESSURE: 120 MMHG | HEIGHT: 66 IN

## 2025-02-24 DIAGNOSIS — O46.90 VAGINAL BLEEDING IN PREGNANCY: ICD-10-CM

## 2025-02-24 DIAGNOSIS — N93.9 VAGINAL BLEEDING: Primary | ICD-10-CM

## 2025-02-24 LAB
ALBUMIN SERPL-MCNC: 4.6 G/DL (ref 3.4–5)
ALBUMIN/GLOB SERPL: 1.5 RATIO
ALP SERPL-CCNC: 85 UNIT/L (ref 50–144)
ALT SERPL-CCNC: 20 UNIT/L (ref 1–45)
ANION GAP SERPL CALC-SCNC: 12 MEQ/L (ref 2–13)
AST SERPL-CCNC: 24 UNIT/L (ref 14–36)
B-HCG FREE SERPL-ACNC: 916.04 MIU/ML
BILIRUB SERPL-MCNC: 0.5 MG/DL (ref 0–1)
BUN SERPL-MCNC: 9 MG/DL (ref 7–20)
CALCIUM SERPL-MCNC: 9.6 MG/DL (ref 8.4–10.2)
CHLORIDE SERPL-SCNC: 106 MMOL/L (ref 98–110)
CO2 SERPL-SCNC: 24 MMOL/L (ref 21–32)
CREAT SERPL-MCNC: 0.79 MG/DL (ref 0.66–1.25)
CREAT/UREA NIT SERPL: 11 (ref 12–20)
ERYTHROCYTE [DISTWIDTH] IN BLOOD BY AUTOMATED COUNT: 12.7 % (ref 11–14.5)
GFR SERPLBLD CREATININE-BSD FMLA CKD-EPI: >90 ML/MIN/1.73/M2
GLOBULIN SER-MCNC: 3 GM/DL (ref 2–3.9)
GLUCOSE SERPL-MCNC: 97 MG/DL (ref 70–115)
GROUP & RH: NORMAL
HCT VFR BLD AUTO: 36.8 % (ref 36–48)
HGB BLD-MCNC: 12.6 G/DL (ref 11.8–16)
INDIRECT COOMBS: NORMAL
MCH RBC QN AUTO: 31 PG (ref 27–34)
MCHC RBC AUTO-ENTMCNC: 34.2 G/DL (ref 31–37)
MCV RBC AUTO: 90.4 FL (ref 79–99)
NRBC BLD AUTO-RTO: 0 %
PLATELET # BLD AUTO: 228 X10(3)/MCL (ref 140–371)
PMV BLD AUTO: 10.2 FL (ref 9.4–12.4)
POTASSIUM SERPL-SCNC: 4.1 MMOL/L (ref 3.5–5.1)
PROT SERPL-MCNC: 7.6 GM/DL (ref 6.3–8.2)
RBC # BLD AUTO: 4.07 X10(6)/MCL (ref 4–5.1)
SODIUM SERPL-SCNC: 142 MMOL/L (ref 136–145)
SPECIMEN OUTDATE: NORMAL
WBC # BLD AUTO: 5.47 X10(3)/MCL (ref 4–11.5)

## 2025-02-24 PROCEDURE — 76801 OB US < 14 WKS SINGLE FETUS: CPT | Mod: ,,, | Performed by: OBSTETRICS & GYNECOLOGY

## 2025-02-24 PROCEDURE — 86850 RBC ANTIBODY SCREEN: CPT | Performed by: OBSTETRICS & GYNECOLOGY

## 2025-02-24 PROCEDURE — 36415 COLL VENOUS BLD VENIPUNCTURE: CPT

## 2025-02-24 PROCEDURE — 84702 CHORIONIC GONADOTROPIN TEST: CPT

## 2025-02-24 PROCEDURE — 3008F BODY MASS INDEX DOCD: CPT | Mod: CPTII,,, | Performed by: OBSTETRICS & GYNECOLOGY

## 2025-02-24 PROCEDURE — 3078F DIAST BP <80 MM HG: CPT | Mod: CPTII,,, | Performed by: OBSTETRICS & GYNECOLOGY

## 2025-02-24 PROCEDURE — 1159F MED LIST DOCD IN RCRD: CPT | Mod: CPTII,,, | Performed by: OBSTETRICS & GYNECOLOGY

## 2025-02-24 PROCEDURE — 3074F SYST BP LT 130 MM HG: CPT | Mod: CPTII,,, | Performed by: OBSTETRICS & GYNECOLOGY

## 2025-02-24 PROCEDURE — 85027 COMPLETE CBC AUTOMATED: CPT

## 2025-02-24 PROCEDURE — 99204 OFFICE O/P NEW MOD 45 MIN: CPT | Mod: TH,,, | Performed by: OBSTETRICS & GYNECOLOGY

## 2025-02-24 PROCEDURE — 76817 TRANSVAGINAL US OBSTETRIC: CPT | Mod: ,,, | Performed by: OBSTETRICS & GYNECOLOGY

## 2025-02-24 PROCEDURE — 80053 COMPREHEN METABOLIC PANEL: CPT

## 2025-02-24 NOTE — PROGRESS NOTES
Chief Complaint:  Vaginal Bleeding    History of Present Illness:  Patient is a 24 y.o.  presents c/o a three day history of heavy vaginal bleeding and cramping following positive pregnancy test. +bleeding today.        Gyn History:  Menstrual History  Cycle: Yes  Menarche Age: 17 years  Flow Duration: 7  Flow: Normal  Interval: 28  Intermenstrual Bleeding: No  Dysmenorrhea: Yes  Dysmenorrhea Severity : Mild    Menopause  Menopause Age: 2 years    Pap History  Last pap date: 10/24/24  Result: Normal  History of Abnormal Pap: No  HPV Vaccine Completed: Yes  HPV Vaccine Injection Type: 2 Injection Series    [place]  Sexually Active: Yes  Sexual Orientation: heterosexual  Postcoital Bleeding: No  Dyspareunia: No  STI History: No  Contraception: No    Breast History  Last Breast Imaging Date: No  History of Breast Biopsy: No      Review of systems:  General/Constitutional: Chills denies. Fatigue/weakness denies. Fever denies. Night sweats denies. Hot flashes denies  Gastrointestinal: Abdominal pain denies. Blood in stool denies. Constipation denies. Diarrhea denies. Heartburn denies. Nausea denies. Vomiting denies   Genitourinary: Incontinence denies. Blood in urine denies. Frequent urination denies. Urgency denies. Painful urination denies. Nocturia denies   Gynecologic: Irregular menses denies. Heavy bleeding  denies. Painful menses denies. Vaginal discharge denies.Vaginal odor denies. Vaginal itching/Irritation denies. Vaginal lesion denies.  Pelvic pain denies. Decreased libido denies. Vulvar lesion denies. Prolapse of genital organs denies. Painful intercourse denies. Postcoital bleeding denies   Psychiatric: Mood lability denies. Depressed mood denies. Suicidal thoughts denies. Anxiety denies. Overwhelmed denies. Appetite normal. Energy level normal      OB History    Para Term  AB Living   2 2 2 0 0 2   SAB IAB Ectopic Multiple Live Births   0 0 0 0 2      # 1 - Date: 23, Sex:  "Female, Weight: 2.637 kg (5 lb 13 oz), GA: 38w0d, Type: , Low Transverse, Apgar1: None, Apgar5: None, Living: Living, Birth Comments: None    # 2 - Date: 25, Sex: Male, Weight: 2.96 kg (6 lb 8.4 oz), GA: 39w1d, Type: , Low Transverse, Apgar1: 8, Apgar5: 6, Living: Living, Birth Comments: None      Past Medical History:   Diagnosis Date    Rubella non-immune status, antepartum 2023     Current Medications[1]      Physical Exam:  /78   Temp 96.8 °F (36 °C)   Ht 5' 6" (1.676 m)   Wt 68.5 kg (151 lb)   LMP 2025   Breastfeeding No   BMI 24.37 kg/m²     Chaperone present.       Constitutional: General appearance: healthy, well-nourished and well-developed  Psychiatric:  Orientation to time, place and person. Normal mood and affect and active, alert   Abdomen: Auscultation/Inspection/Palpation: No tenderness or masses. Soft, nondistended      Female Genitalia:      Vulva: no masses, atrophy or lesions      Bladder/Urethra: no urethral discharge or mass, normal meatus, bladder non-distended.      Vagina: no tenderness, erythema, cystocele, rectocele, abnormal vaginal discharge, or vesicle(s) or ulcers, +scant blood, no clots, no pooling     Cervix: no discharge or cervical motion tenderness and grossly normal     Uterus: normal size and shape and midline, non-tender, and no uterine prolapse.     Adnexa/Parametria: no parametrial tenderness or mass, no adnexal tenderness or ovarian mass.           Assessment/Plan:  1. Vaginal bleeding in pregnancy  Educated  Normal exam  Labs: T&S, CMP, CBC  HCG today, repeat 48 hours  Bleeding/ER precautions  RTC Wednesday afternoon    TVUS today: Thickened endometrium, No GS, no YS, no free fluid, NT        This note was transcribed by Chelsie Kaminski MA. There may be transcription errors as a result, however minimal. I agree with transcription and every effort has been made to ensure accuracy of transcription, but any obvious errors or omissions " should be clarified with the author of the document.             [1] No current outpatient medications on file.

## 2025-02-26 ENCOUNTER — ROUTINE PRENATAL (OUTPATIENT)
Dept: OBSTETRICS AND GYNECOLOGY | Facility: CLINIC | Age: 25
End: 2025-02-26
Payer: MEDICAID

## 2025-02-26 VITALS
TEMPERATURE: 98 F | WEIGHT: 151.19 LBS | SYSTOLIC BLOOD PRESSURE: 116 MMHG | BODY MASS INDEX: 24.3 KG/M2 | HEIGHT: 66 IN | DIASTOLIC BLOOD PRESSURE: 72 MMHG

## 2025-02-26 DIAGNOSIS — O46.90 VAGINAL BLEEDING IN PREGNANCY: Primary | ICD-10-CM

## 2025-02-26 NOTE — PROGRESS NOTES
Chief Complaint:  f/u HCG    History of Present Illness:  Patient is a 24 y.o.  presents to follow HCG trend secondary to heavy vaginal bleeding with positive pregnancy test. Mild  bleeding today. Cramping resolved. Pt did not do second HCG.        Gyn History:  Menstrual History  Cycle: Yes  Menarche Age: 17 years  Flow Duration: 7  Flow: (!) Heavy  Intermenstrual Bleeding: No  Dysmenorrhea: Yes  Dysmenorrhea Severity : Mild    Menopause  Menopause Age: 2 years    Pap History  Last pap date: 10/24/24  Result: Normal  History of Abnormal Pap: No  HPV Vaccine Completed: Yes  HPV Vaccine Injection Type: 2 Injection Series    [place]  Sexually Active: Yes  Sexual Orientation: heterosexual  Postcoital Bleeding: No  Dyspareunia: No  STI History: No  Contraception: No    Breast History  Last Breast Imaging Date: No  History of Breast Biopsy: No      Review of systems:  General/Constitutional: Chills denies. Fatigue/weakness denies. Fever denies. Night sweats denies. Hot flashes denies  Breast: Dimpling denies. Breast mass denies. Breast pain/tenderness denies. Nipple discharge denies. Puckering denies.  Gastrointestinal: Abdominal pain denies. Blood in stool denies. Constipation denies. Diarrhea denies. Heartburn denies. Nausea denies. Vomiting denies   Genitourinary: Incontinence denies. Blood in urine denies. Frequent urination denies. Urgency denies. Painful urination denies. Nocturia denies   Gynecologic: Irregular menses denies. Heavy bleeding denies. Painful menses denies. Vaginal discharge denies. Vaginal odor denies. Vaginal itching/Irritation denies. Vaginal lesion denies.  Pelvic pain denies. Decreased libido denies. Vulvar lesion denies. Prolapse of genital organs denies. Painful intercourse denies. Postcoital bleeding denies   Psychiatric: Mood lability denies. Depressed mood denies. Suicidal thoughts denies. Anxiety denies. Overwhelmed denies. Appetite normal. Energy level normal.     OB History  "   Para Term  AB Living   2 2 2 0 0 2   SAB IAB Ectopic Multiple Live Births   0 0 0 0 2      # 1 - Date: 23, Sex: Female, Weight: 2.637 kg (5 lb 13 oz), GA: 38w0d, Type: , Low Transverse, Apgar1: None, Apgar5: None, Living: Living, Birth Comments: None    # 2 - Date: 25, Sex: Male, Weight: 2.96 kg (6 lb 8.4 oz), GA: 39w1d, Type: , Low Transverse, Apgar1: 8, Apgar5: 6, Living: Living, Birth Comments: None      Past Medical History:   Diagnosis Date    Rubella non-immune status, antepartum 2023     Current Medications[1]      Physical Exam:  /72   Temp 97.9 °F (36.6 °C)   Ht 5' 6" (1.676 m)   Wt 68.6 kg (151 lb 3.2 oz)   LMP 2025   BMI 24.40 kg/m²     Constitutional: General appearance: healthy, well-nourished and well-developed   Psychiatric:  Orientation to time, place and person. Normal mood and affect and active, alert   Abdomen: Auscultation/Inspection/Palpation: No tenderness or masses. Soft, nondistended         Assessment/Plan:  1. Vaginal bleeding in pregnancy  Educated  Reviewed HCG w/ pt  Advised to draw second HCG  MBT: O POS  HCG ([phone], repeat hcg quant   Bleeding/ER precautions  Discussed bleeding patterns  Will call w/ results        This note was transcribed by Chelsie Kaminski MA. There may be transcription errors as a result, however minimal. I agree with transcription and every effort has been made to ensure accuracy of transcription, but any obvious errors or omissions should be clarified with the author of the document.               [1] No current outpatient medications on file.    "

## 2025-02-27 ENCOUNTER — TELEPHONE (OUTPATIENT)
Dept: OBSTETRICS AND GYNECOLOGY | Facility: CLINIC | Age: 25
End: 2025-02-27
Payer: MEDICAID

## 2025-02-27 ENCOUNTER — LAB VISIT (OUTPATIENT)
Dept: LAB | Facility: HOSPITAL | Age: 25
End: 2025-02-27
Attending: OBSTETRICS & GYNECOLOGY
Payer: MEDICAID

## 2025-02-27 DIAGNOSIS — O46.90 VAGINAL BLEEDING IN PREGNANCY: ICD-10-CM

## 2025-02-27 LAB — B-HCG FREE SERPL-ACNC: 247.18 MIU/ML

## 2025-02-27 PROCEDURE — 84702 CHORIONIC GONADOTROPIN TEST: CPT

## 2025-02-27 PROCEDURE — 36415 COLL VENOUS BLD VENIPUNCTURE: CPT

## 2025-02-27 NOTE — TELEPHONE ENCOUNTER
Spoke with Jovana, pregnancy levels are decreasing and consistent with a miscarriage. Educate bleeding is to be expected. Give ER/pain/bleeding precautions. Pregnancy test next week, call if positive. Pt voiced understanding. Will cancel upcoming new ob visit.

## (undated) DEVICE — GLOVE PROTEXIS PI SYN SURG 6.5

## (undated) DEVICE — SUT MONOCRYL CT 0 36IN

## (undated) DEVICE — SUT VICRYL 3-0 CTX 36IN

## (undated) DEVICE — CHLORAPREP W TINT 26ML APPL

## (undated) DEVICE — ADHESIVE DERMABOND ADVANCED

## (undated) DEVICE — SOL LAC RINGERS 1000ML INJ

## (undated) DEVICE — SYR 10CC LUER LOCK

## (undated) DEVICE — SUT PLAIN GUT 3-0 CTX 27

## (undated) DEVICE — TRAY CATH URETHRAL FOLEY 16FR

## (undated) DEVICE — UNDERGLOVES BIOGEL PI SZ 7 LF

## (undated) DEVICE — SUT 0 27IN CHROMIC GUT CT-1

## (undated) DEVICE — UNDERPAD ULTRASORB 300LB 30X36

## (undated) DEVICE — TOWEL OR DISP STRL BLUE 4/PK

## (undated) DEVICE — SUT PDS II 96IN XLH TAPER

## (undated) DEVICE — DRAPE HALF SURGICAL 40X58IN

## (undated) DEVICE — PAD SURFACE PREP 17